# Patient Record
Sex: MALE | Race: WHITE | NOT HISPANIC OR LATINO | Employment: FULL TIME | URBAN - METROPOLITAN AREA
[De-identification: names, ages, dates, MRNs, and addresses within clinical notes are randomized per-mention and may not be internally consistent; named-entity substitution may affect disease eponyms.]

---

## 2017-03-02 ENCOUNTER — OFFICE VISIT (OUTPATIENT)
Dept: URGENT CARE | Facility: PHYSICIAN GROUP | Age: 27
End: 2017-03-02
Payer: COMMERCIAL

## 2017-03-02 VITALS
SYSTOLIC BLOOD PRESSURE: 122 MMHG | DIASTOLIC BLOOD PRESSURE: 76 MMHG | BODY MASS INDEX: 24.25 KG/M2 | HEART RATE: 80 BPM | WEIGHT: 195 LBS | TEMPERATURE: 98.6 F | OXYGEN SATURATION: 96 % | HEIGHT: 75 IN

## 2017-03-02 DIAGNOSIS — J01.40 ACUTE PANSINUSITIS, RECURRENCE NOT SPECIFIED: Primary | ICD-10-CM

## 2017-03-02 DIAGNOSIS — J40 BRONCHITIS: ICD-10-CM

## 2017-03-02 PROCEDURE — 99214 OFFICE O/P EST MOD 30 MIN: CPT | Performed by: PHYSICIAN ASSISTANT

## 2017-03-02 RX ORDER — CLARITHROMYCIN 500 MG/1
500 TABLET, COATED ORAL 2 TIMES DAILY
Qty: 20 TAB | Refills: 0 | Status: SHIPPED | OUTPATIENT
Start: 2017-03-02 | End: 2018-02-13

## 2017-03-02 ASSESSMENT — ENCOUNTER SYMPTOMS: COUGH: 1

## 2017-03-02 NOTE — Clinical Note
March 2, 2017         Patient: Ladarius Soler   YOB: 1990   Date of Visit: 3/2/2017           To Whom it May Concern:    Ladarius Soler was seen in my clinic on 3/2/2017. He may return to work on Monday March 6, 2017 or sooner if condition improves sooner.    If you have any questions or concerns, please don't hesitate to call.        Sincerely,           Divina Aguilar PA-C  Electronically Signed

## 2017-03-02 NOTE — PROGRESS NOTES
"Subjective:      Ladarius Soler is a 26 y.o. male who presents with Cough    PMH:Reviewed with patient/family member/EPIC.   MEDS:   Current outpatient prescriptions:   •  fluticasone (FLONASE) 50 MCG/ACT nasal spray, Spray 2 Sprays in nose every day., Disp: 1 Bottle, Rfl: 0  •  ibuprofen (MOTRIN) 800 MG TABS, Take 800 mg by mouth every 8 hours as needed., Disp: , Rfl:   •  naproxen (NAPROSYN) 500 MG TABS, Take 1 Tab by mouth 2 times a day, with meals., Disp: 60 Tab, Rfl: 0  ALLERGIES: No Known Allergies  SURGHX: History reviewed. No pertinent past surgical history.  SOCHX:  reports that he has never smoked. He has never used smokeless tobacco.  FH: Reviewed with patient/family. Not pertinent to this complaint.           HPI Comments: Patient presents with:  Cough: congestion, body weakness x 1 month        Cough  This is a new problem. The current episode started 1 to 4 weeks ago. The problem has been gradually worsening. The problem occurs every few minutes. The cough is productive of sputum. Associated symptoms include ear congestion, ear pain, headaches, nasal congestion, postnasal drip and rhinorrhea. Pertinent negatives include no fever, sore throat or wheezing. The symptoms are aggravated by lying down (exertion). He has tried body position changes and OTC cough suppressant (nasal steroid) for the symptoms. The treatment provided no relief.       Review of Systems   Constitutional: Positive for malaise/fatigue. Negative for fever.   HENT: Positive for congestion, ear pain, postnasal drip and rhinorrhea. Negative for sore throat.    Respiratory: Positive for cough and sputum production. Negative for wheezing.    Neurological: Positive for headaches.   All other systems reviewed and are negative.         Objective:     /76 mmHg  Pulse 80  Temp(Src) 37 °C (98.6 °F)  Ht 1.905 m (6' 3\")  Wt 88.451 kg (195 lb)  BMI 24.37 kg/m2  SpO2 96%     Physical Exam   Constitutional: He is oriented to person, " place, and time. He appears well-developed and well-nourished. No distress.   HENT:   Head: Normocephalic.   Right Ear: Tympanic membrane normal.   Left Ear: Tympanic membrane normal.   Nose: Right sinus exhibits maxillary sinus tenderness and frontal sinus tenderness. Left sinus exhibits maxillary sinus tenderness and frontal sinus tenderness.   Mouth/Throat: Uvula is midline and mucous membranes are normal. Posterior oropharyngeal erythema (+ PND present) present.   Eyes: Conjunctivae and EOM are normal. Pupils are equal, round, and reactive to light.   Neck: Normal range of motion. Neck supple.   Cardiovascular: Normal rate, regular rhythm and normal heart sounds.    Pulmonary/Chest: Effort normal and breath sounds normal. He has no rales.   Abdominal: Soft.   Musculoskeletal: Normal range of motion.   Neurological: He is alert and oriented to person, place, and time.   Skin: Skin is warm.   Psychiatric: He has a normal mood and affect.   Nursing note and vitals reviewed.         Assessment/Plan:     1. Acute pansinusitis, recurrence not specified  clarithromycin (BIAXIN) 500 MG Tab    Hydrocod Polst-CPM Polst ER (TUSSIONEX) 10-8 MG/5ML Suspension Extended Release   2. Bronchitis  clarithromycin (BIAXIN) 500 MG Tab    Hydrocod Polst-CPM Polst ER (TUSSIONEX) 10-8 MG/5ML Suspension Extended Release     PT can continue OTC medications, increase fluids and rest until symptoms improve.     Motrin/Advil/Ibuprophen 600 mg every 6 hours as needed for pain or fever.    PT should follow up with PCP in 1-2 days for re-evaluation if symptoms have not improved.  Discussed red flags and reasons to return to UC or ED.  Pt and/or family verbalized understanding of diagnosis and follow up instructions and declined informational handout on diagnosis.  PT discharged.

## 2017-03-02 NOTE — MR AVS SNAPSHOT
"        Ladarius Soler   3/2/2017 2:15 PM   Office Visit   MRN: [id number]    Department:  [facility]   Dept Phone:  [phone]    Description:  Male : 1990   Provider:  Divina Aguilar PA-C           Reason for Visit     Cough congestion, body weakness x 1 month      Allergies as of 3/2/2017     No Known Allergies      You were diagnosed with     Acute recurrent pansinusitis   [070697]       Bronchitis   [[id number]]         Vital Signs     Blood Pressure Pulse Temperature Height Weight Body Mass Index    122/76 mmHg 80 37 °C (98.6 °F) 1.905 m (6' 3\") 88.451 kg (195 lb) 24.37 kg/m2    Oxygen Saturation Smoking Status                96% Never Smoker           Basic Information     Date Of Birth Sex Race Ethnicity Preferred Language    1990 Male White Non- English      Health Maintenance        Date Due Completion Dates    IMM HEP B VACCINE (1 of 3 - Primary Series) 1990 ---    IMM HEP A VACCINE (1 of 2 - Standard Series) 6/15/1993 ---    IMM HPV VACCINE (1 of 3 - Male 3 Dose Series) 6/15/2003 ---    IMM VARICELLA (CHICKENPOX) VACCINE (1 of 2 - 2 Dose Adolescent Series) 6/15/2005 ---    IMM DTaP/Tdap/Td Vaccine (1 - Tdap) 6/15/2011 ---    IMM INFLUENZA (1) 2016 ---            Current Immunizations     No immunizations on file.      Below and/or attached are the medications your provider expects you to take. Review all of your home medications and newly ordered medications with your provider and/or pharmacist. Follow medication instructions as directed by your provider and/or pharmacist. Please keep your medication list with you and share with your provider. Update the information when medications are discontinued, doses are changed, or new medications (including over-the-counter products) are added; and carry medication information at all times in the event of emergency situations     Allergies:  No Known Allergies          Medications  Valid as of: 2017 -  2:52 PM   " Generic Name Brand Name Tablet Size Instructions for use    Clarithromycin (Tab) BIAXIN 500 MG Take 1 Tab by mouth 2 times a day.        Fluticasone Propionate (Suspension) FLONASE 50 MCG/ACT Spray 2 Sprays in nose every day.        Hydrocod Polst-Chlorphen Polst (Suspension Extended Release) TUSSIONEX 10-8 MG/5ML Take 5 mL by mouth every 12 hours.        Ibuprofen (Tab) MOTRIN 800 MG Take 800 mg by mouth every 8 hours as needed.        Naproxen (Tab) NAPROSYN 500 MG Take 1 Tab by mouth 2 times a day, with meals.        .                 Medicines prescribed today were sent to:     [id number]  [place], [place] - [address] [facility] [place]    [address] [place] [address]    Phone: [phone] Fax: [phone]    Open 24 Hours?: No      Medication refill instructions:       If your prescription bottle indicates you have medication refills left, it is not necessary to call your provider’s office. Please contact your pharmacy and they will refill your medication.    If your prescription bottle indicates you do not have any refills left, you may request refills at any time through one of the following ways: The online DataRobot system (except Urgent Care), by calling your provider’s office, or by asking your pharmacy to contact your provider’s office with a refill request. Medication refills are processed only during regular business hours and may not be available until the next business day. Your provider may request additional information or to have a follow-up visit with you prior to refilling your medication.   *Please Note: Medication refills are assigned a new Rx number when refilled electronically. Your pharmacy may indicate that no refills were authorized even though a new prescription for the same medication is available at the pharmacy. Please request the medicine by name with the pharmacy before contacting your provider for a refill.           DataRobot Access Code:  [email or web address]  Expires: 4/1/2017  2:10 PM    Lessons Only  A secure, online tool to manage your health information     [employer]’s Lessons Only® is a secure, online tool that connects you to your personalized health information from the privacy of your home -- day or night - making it very easy for you to manage your healthcare. Once the activation process is completed, you can even access your medical information using the Lessons Only johana, which is available for free in the Apple Johana store or Google Play store.     Lessons Only provides the following levels of access (as shown below):   My Chart Features   [facility] Primary Care Doctor [facility]  Specialists [facility]  Urgent  Care Non-[facility]  Primary Care  Doctor   Email your healthcare team securely and privately 24/7 X X X    Manage appointments: schedule your next appointment; view details of past/upcoming appointments X      Request prescription refills. X      View recent personal medical records, including lab and immunizations X X X X   View health record, including health history, allergies, medications X X X X   Read reports about your outpatient visits, procedures, consult and ER notes X X X X   See your discharge summary, which is a recap of your hospital and/or ER visit that includes your diagnosis, lab results, and care plan. X X       How to register for Lessons Only:  1. Go to  [email or web address].  2. Click on the Sign Up Now box, which takes you to the New Member Sign Up page. You will need to provide the following information:  a. Enter your Lessons Only Access Code exactly as it appears at the top of this page. (You will not need to use this code after you’ve completed the sign-up process. If you do not sign up before the expiration date, you must request a new code.)   b. Enter your date of birth.   c. Enter your home email address.   d. Click Submit, and follow the next screen’s instructions.  3. Create a Lessons Only ID. This will be your Lessons Only login ID and cannot be  changed, so think of one that is secure and easy to remember.  4. Create a Prom[employer]ior password. You can change your password at any time.  5. Enter your Password Reset Question and Answer. This can be used at a later time if you forget your password.   6. Enter your e-mail address. This allows you to receive e-mail notifications when new information is available in Promedior.  7. Click Sign Up. You can now view your health information.    For assistance activating your Promedior account, call [phone]

## 2017-03-06 ASSESSMENT — ENCOUNTER SYMPTOMS
SPUTUM PRODUCTION: 1
RHINORRHEA: 1
WHEEZING: 0
FEVER: 0
HEADACHES: 1
SORE THROAT: 0

## 2017-03-23 ENCOUNTER — OFFICE VISIT (OUTPATIENT)
Dept: URGENT CARE | Facility: PHYSICIAN GROUP | Age: 27
End: 2017-03-23
Payer: COMMERCIAL

## 2017-03-23 VITALS
TEMPERATURE: 97.4 F | BODY MASS INDEX: 23.75 KG/M2 | WEIGHT: 195 LBS | OXYGEN SATURATION: 99 % | HEIGHT: 76 IN | HEART RATE: 100 BPM | DIASTOLIC BLOOD PRESSURE: 84 MMHG | SYSTOLIC BLOOD PRESSURE: 152 MMHG

## 2017-03-23 DIAGNOSIS — R05.9 COUGH: ICD-10-CM

## 2017-03-23 DIAGNOSIS — J02.9 PHARYNGITIS, UNSPECIFIED ETIOLOGY: ICD-10-CM

## 2017-03-23 LAB
INT CON NEG: NEGATIVE
INT CON POS: POSITIVE
S PYO AG THROAT QL: NORMAL

## 2017-03-23 PROCEDURE — 87880 STREP A ASSAY W/OPTIC: CPT | Performed by: PHYSICIAN ASSISTANT

## 2017-03-23 PROCEDURE — 99214 OFFICE O/P EST MOD 30 MIN: CPT | Performed by: PHYSICIAN ASSISTANT

## 2017-03-23 RX ORDER — FLUTICASONE PROPIONATE 50 MCG
1 SPRAY, SUSPENSION (ML) NASAL DAILY
Qty: 16 G | Refills: 0 | Status: SHIPPED | OUTPATIENT
Start: 2017-03-23 | End: 2021-05-03

## 2017-03-23 ASSESSMENT — ENCOUNTER SYMPTOMS
DIARRHEA: 0
SORE THROAT: 1
COUGH: 1
SHORTNESS OF BREATH: 0
SPUTUM PRODUCTION: 0
NAUSEA: 0
CHILLS: 0
ABDOMINAL PAIN: 0
WHEEZING: 0
VOMITING: 0
FEVER: 0

## 2017-03-23 NOTE — MR AVS SNAPSHOT
"Ladarius Soler   3/23/2017 1:55 PM   Office Visit   MRN: [id number]    Department:  [facility]   Dept Phone:  [phone]    Description:  Male : 1990   Provider:  George Lopez PA-C           Reason for Visit     Pharyngitis sore throat, mild cough, sob x2days       Allergies as of 3/23/2017     No Known Allergies      You were diagnosed with     Cough   [786.2.ICD-9-CM]       Pharyngitis, unspecified etiology   [7123657]         Vital Signs     Blood Pressure Pulse Temperature Height Weight Body Mass Index    152/84 mmHg 100 36.3 °C (97.4 °F) 1.93 m (6' 4\") 88.451 kg (195 lb) 23.75 kg/m2    Oxygen Saturation Smoking Status                99% Never Smoker           Basic Information     Date Of Birth Sex Race Ethnicity Preferred Language    1990 Male White Non- English      Health Maintenance        Date Due Completion Dates    IMM HEP B VACCINE (1 of 3 - Primary Series) 1990 ---    IMM HEP A VACCINE (1 of 2 - Standard Series) 6/15/1993 ---    IMM HPV VACCINE (1 of 3 - Male 3 Dose Series) 6/15/2003 ---    IMM VARICELLA (CHICKENPOX) VACCINE (1 of 2 - 2 Dose Adolescent Series) 6/15/2005 ---    IMM DTaP/Tdap/Td Vaccine (1 - Tdap) 6/15/2011 ---    IMM INFLUENZA (1) 2016 ---            Results     POCT Rapid Strep A      Component    Rapid Strep Screen    neg    Internal Control Positive    Positive    Internal Control Negative    Negative                        Current Immunizations     No immunizations on file.      Below and/or attached are the medications your provider expects you to take. Review all of your home medications and newly ordered medications with your provider and/or pharmacist. Follow medication instructions as directed by your provider and/or pharmacist. Please keep your medication list with you and share with your provider. Update the information when medications are discontinued, doses are changed, or new medications (including over-the-counter " products) are added; and carry medication information at all times in the event of emergency situations     Allergies:  No Known Allergies          Medications  Valid as of: March 23, 2017 -  7:10 PM    Generic Name Brand Name Tablet Size Instructions for use    Clarithromycin (Tab) BIAXIN 500 MG Take 1 Tab by mouth 2 times a day.        Fluticasone Propionate (Suspension) FLONASE 50 MCG/ACT Spray 2 Sprays in nose every day.        Fluticasone Propionate (Suspension) FLONASE 50 MCG/ACT Spray 1 Spray in nose every day.        Hydrocod Polst-Chlorphen Polst (Suspension Extended Release) TUSSIONEX 10-8 MG/5ML Take 5 mL by mouth every 12 hours.        Ibuprofen (Tab) MOTRIN 800 MG Take 800 mg by mouth every 8 hours as needed.        Lidocaine HCl (Solution) XYLOCAINE 2 % Take 5 mL by mouth as needed for Throat/Mouth Pain (q6hr PRN throat pain, ok to rinse and spit or swallow).        Naproxen (Tab) NAPROSYN 500 MG Take 1 Tab by mouth 2 times a day, with meals.        .                 Medicines prescribed today were sent to:     [facility] [address] [place] [address] AT [address] [facility] [address]    Phone: [phone] Fax: [phone]    Open 24 Hours?: No      Medication refill instructions:       If your prescription bottle indicates you have medication refills left, it is not necessary to call your provider’s office. Please contact your pharmacy and they will refill your medication.    If your prescription bottle indicates you do not have any refills left, you may request refills at any time through one of the following ways: The online Connectify system (except Urgent Care), by calling your provider’s office, or by asking your pharmacy to contact your provider’s office with a refill request. Medication refills are processed only during regular business hours and may not be available until the next business day. Your provider may request additional  information or to have a follow-up visit with you prior to refilling your medication.   *Please Note: Medication refills are assigned a new Rx number when refilled electronically. Your pharmacy may indicate that no refills were authorized even though a new prescription for the same medication is available at the pharmacy. Please request the medicine by name with the pharmacy before contacting your provider for a refill.           ZeroG Wireless Access Code: [email or web address]  Expires: 4/1/2017  3:10 PM    ZeroG Wireless  A secure, online tool to manage your health information     [employer]’s ZeroG Wireless® is a secure, online tool that connects you to your personalized health information from the privacy of your home -- day or night - making it very easy for you to manage your healthcare. Once the activation process is completed, you can even access your medical information using the ZeroG Wireless johana, which is available for free in the Apple Johana store or Google Play store.     ZeroG Wireless provides the following levels of access (as shown below):   My Chart Features   [facility] Primary Care Doctor [facility]  Specialists [facility]  Urgent  Care Non-[facility]  Primary Care  Doctor   Email your healthcare team securely and privately 24/7 X X X    Manage appointments: schedule your next appointment; view details of past/upcoming appointments X      Request prescription refills. X      View recent personal medical records, including lab and immunizations X X X X   View health record, including health history, allergies, medications X X X X   Read reports about your outpatient visits, procedures, consult and ER notes X X X X   See your discharge summary, which is a recap of your hospital and/or ER visit that includes your diagnosis, lab results, and care plan. X X       How to register for ZeroG Wireless:  1. Go to  [email or web address].  2. Click on the Sign Up Now box, which takes you to the New Member Sign Up page. You will need to provide the following  information:  a. Enter your WritePath Access Code exactly as it appears at the top of this page. (You will not need to use this code after you’ve completed the sign-up process. If you do not sign up before the expiration date, you must request a new code.)   b. Enter your date of birth.   c. Enter your home email address.   d. Click Submit, and follow the next screen’s instructions.  3. Create a WritePath ID. This will be your WritePath login ID and cannot be changed, so think of one that is secure and easy to remember.  4. Create a WritePath password. You can change your password at any time.  5. Enter your Password Reset Question and Answer. This can be used at a later time if you forget your password.   6. Enter your e-mail address. This allows you to receive e-mail notifications when new information is available in WritePath.  7. Click Sign Up. You can now view your health information.    For assistance activating your WritePath account, call [phone]

## 2017-03-23 NOTE — Clinical Note
March 23, 2017       Patient: Ladarius Soler   YOB: 1990   Date of Visit: 3/23/2017         To Whom It May Concern:    It is my medical opinion that Ladarius Soler should be permitted to return to work late as he was at a doctor's visit.    If you have any questions or concerns, please don't hesitate to call [phone]          Sincerely,          George Lopez PA-C  Electronically Signed

## 2017-03-23 NOTE — PROGRESS NOTES
Subjective:      Ladarius Soler is a 26 y.o. male who presents with Pharyngitis            Pharyngitis   Associated symptoms include congestion, coughing ( very mild) and ear pain ( mild). Pertinent negatives include no abdominal pain, diarrhea, shortness of breath or vomiting.   Pt tx'ed for bronchitis, finished abx about 2wks ago, still w/ ST/cough, notes throat pain last two days, denies fever/chills, cough has been resolved, right ear press, denies much pain, sinus congestion has improved denies chest congestion, denies nausea/voimting/abdpain/diarrhea/rash. Denies PMH of bronnchits, dneies PMH of asthma/pneumonia, some seasonal allerg. Tried using nyquil/mucinex. Dayquil.     Review of Systems   Constitutional: Negative for fever and chills.   HENT: Positive for congestion, ear pain ( mild) and sore throat ( primary complaint).    Respiratory: Positive for cough ( very mild). Negative for sputum production, shortness of breath and wheezing.    Gastrointestinal: Negative for nausea, vomiting, abdominal pain and diarrhea.   Skin: Negative for rash.   Endo/Heme/Allergies: Positive for environmental allergies.       PMH:  has no past medical history on file.  MEDS:   Current outpatient prescriptions:   •  clarithromycin (BIAXIN) 500 MG Tab, Take 1 Tab by mouth 2 times a day., Disp: 20 Tab, Rfl: 0  •  Hydrocod Polst-CPM Polst ER (TUSSIONEX) 10-8 MG/5ML Suspension Extended Release, Take 5 mL by mouth every 12 hours., Disp: 140 mL, Rfl: 0  •  fluticasone (FLONASE) 50 MCG/ACT nasal spray, Spray 2 Sprays in nose every day., Disp: 1 Bottle, Rfl: 0  •  ibuprofen (MOTRIN) 800 MG TABS, Take 800 mg by mouth every 8 hours as needed., Disp: , Rfl:   •  naproxen (NAPROSYN) 500 MG TABS, Take 1 Tab by mouth 2 times a day, with meals., Disp: 60 Tab, Rfl: 0  ALLERGIES: No Known Allergies  SURGHX: No past surgical history on file.  SOCHX:  reports that he has never smoked. He has never used smokeless tobacco.  FH: Family history  "was reviewed, no pertinent findings to report    I have worn a mask for the entire encounter with this patient.      Objective:     /84 mmHg  Pulse 100  Temp(Src) 36.3 °C (97.4 °F)  Ht 1.93 m (6' 4\")  Wt 88.451 kg (195 lb)  BMI 23.75 kg/m2  SpO2 99%     Physical Exam   Constitutional: He is oriented to person, place, and time. He appears well-developed and well-nourished. No distress.   HENT:   Head: Normocephalic and atraumatic.   Right Ear: Tympanic membrane, external ear and ear canal normal.   Left Ear: Tympanic membrane, external ear and ear canal normal.   Nose: Nose normal. Right sinus exhibits no maxillary sinus tenderness and no frontal sinus tenderness. Left sinus exhibits no maxillary sinus tenderness and no frontal sinus tenderness.   Mouth/Throat: Uvula is midline and mucous membranes are normal. Posterior oropharyngeal erythema ( mild PND) present. No oropharyngeal exudate, posterior oropharyngeal edema or tonsillar abscesses.   Eyes: Conjunctivae are normal. Right eye exhibits no discharge. Left eye exhibits no discharge. No scleral icterus.   Neck: Neck supple.   Pulmonary/Chest: Effort normal and breath sounds normal. No respiratory distress. He has no decreased breath sounds. He has no wheezes. He has no rhonchi. He has no rales.   Musculoskeletal: Normal range of motion.   Lymphadenopathy:     He has cervical adenopathy ( mild bilat).   Neurological: He is alert and oriented to person, place, and time. Coordination normal.   Skin: Skin is warm and dry. He is not diaphoretic. No pallor.   Psychiatric: He has a normal mood and affect.   Nursing note and vitals reviewed.       POCT strep - NEG       Assessment/Plan:     1. Cough  Supportive care is reviewed with patient/caregiver - recommend to push PO fluids and electrolytes, Nsaids/tylenol, netti pot/saline irrig, humidifier in home, flonase, ponaris, antihistamines, discuss likely resolving cough from bronchitis and seasonal allergic " rhinitis w/ PND, trend resolution w/ supportive care  Return to clinic with lack of resolution or progression of symptoms.  Sent w/ work note  - POCT Rapid Strep A    2. Pharyngitis, unspecified etiology  - lidocaine viscous 2% (XYLOCAINE) 2 % Solution; Take 5 mL by mouth as needed for Throat/Mouth Pain (q6hr PRN throat pain, ok to rinse and spit or swallow).  Dispense: 120 mL; Refill: 0  - fluticasone (FLONASE) 50 MCG/ACT nasal spray; Spray 1 Spray in nose every day.  Dispense: 16 g; Refill: 0

## 2017-08-03 ENCOUNTER — HOSPITAL ENCOUNTER (OUTPATIENT)
Facility: MEDICAL CENTER | Age: 27
End: 2017-08-03
Attending: PHYSICIAN ASSISTANT
Payer: COMMERCIAL

## 2017-08-03 ENCOUNTER — OFFICE VISIT (OUTPATIENT)
Dept: URGENT CARE | Facility: CLINIC | Age: 27
End: 2017-08-03
Payer: COMMERCIAL

## 2017-08-03 VITALS
HEIGHT: 75 IN | DIASTOLIC BLOOD PRESSURE: 80 MMHG | WEIGHT: 195 LBS | SYSTOLIC BLOOD PRESSURE: 126 MMHG | BODY MASS INDEX: 24.25 KG/M2 | RESPIRATION RATE: 14 BRPM | OXYGEN SATURATION: 100 % | HEART RATE: 66 BPM | TEMPERATURE: 98.2 F

## 2017-08-03 DIAGNOSIS — J02.9 SORE THROAT: ICD-10-CM

## 2017-08-03 DIAGNOSIS — J35.1 HYPERTROPHY TONSILS: ICD-10-CM

## 2017-08-03 LAB
INT CON NEG: NEGATIVE
INT CON POS: POSITIVE
S PYO AG THROAT QL: NORMAL

## 2017-08-03 PROCEDURE — 99000 SPECIMEN HANDLING OFFICE-LAB: CPT | Performed by: PHYSICIAN ASSISTANT

## 2017-08-03 PROCEDURE — 87070 CULTURE OTHR SPECIMN AEROBIC: CPT

## 2017-08-03 PROCEDURE — 87880 STREP A ASSAY W/OPTIC: CPT | Performed by: PHYSICIAN ASSISTANT

## 2017-08-03 PROCEDURE — 99214 OFFICE O/P EST MOD 30 MIN: CPT | Performed by: PHYSICIAN ASSISTANT

## 2017-08-03 ASSESSMENT — ENCOUNTER SYMPTOMS
DIARRHEA: 0
VOMITING: 0
FEVER: 0
DIZZINESS: 0
NECK PAIN: 0
SORE THROAT: 1
EYE REDNESS: 0
ABDOMINAL PAIN: 0
CHILLS: 0
WHEEZING: 0
EYE DISCHARGE: 0
TINGLING: 0
MYALGIAS: 0
COUGH: 0
HEADACHES: 0

## 2017-08-03 ASSESSMENT — PAIN SCALES - GENERAL: PAINLEVEL: 4=SLIGHT-MODERATE PAIN

## 2017-08-03 ASSESSMENT — PATIENT HEALTH QUESTIONNAIRE - PHQ9: CLINICAL INTERPRETATION OF PHQ2 SCORE: 0

## 2017-08-03 NOTE — PROGRESS NOTES
"Subjective:      Ladarius Soler is a 27 y.o. male who presents with Pharyngitis            Pharyngitis   This is a new problem. The current episode started today. The problem has been unchanged. Neither side of throat is experiencing more pain than the other. There has been no fever. The pain is at a severity of 3/10. The pain is mild. Pertinent negatives include no abdominal pain, congestion, coughing, diarrhea, ear discharge, headaches, neck pain or vomiting. He has had no exposure to strep or mono. He has tried nothing for the symptoms. The treatment provided no relief.       Review of Systems   Constitutional: Negative for fever, chills and malaise/fatigue.   HENT: Positive for sore throat. Negative for congestion and ear discharge.    Eyes: Negative for discharge and redness.   Respiratory: Negative for cough and wheezing.    Cardiovascular: Negative for chest pain and leg swelling.   Gastrointestinal: Negative for vomiting, abdominal pain and diarrhea.   Genitourinary: Negative for dysuria and urgency.   Musculoskeletal: Negative for myalgias and neck pain.   Skin: Negative for itching and rash.   Neurological: Negative for dizziness, tingling and headaches.          Objective:     /80 mmHg  Pulse 66  Temp(Src) 36.8 °C (98.2 °F)  Resp 14  Ht 1.905 m (6' 3\")  Wt 88.451 kg (195 lb)  BMI 24.37 kg/m2  SpO2 100%   PMH:  has no past medical history on file.  MEDS:   Current outpatient prescriptions:   •  fluticasone (FLONASE) 50 MCG/ACT nasal spray, Spray 1 Spray in nose every day., Disp: 16 g, Rfl: 0  •  lidocaine viscous 2% (XYLOCAINE) 2 % Solution, Take 5 mL by mouth as needed for Throat/Mouth Pain (q6hr PRN throat pain, ok to rinse and spit or swallow)., Disp: 120 mL, Rfl: 0  •  clarithromycin (BIAXIN) 500 MG Tab, Take 1 Tab by mouth 2 times a day., Disp: 20 Tab, Rfl: 0  •  Hydrocod Polst-CPM Polst ER (TUSSIONEX) 10-8 MG/5ML Suspension Extended Release, Take 5 mL by mouth every 12 hours., Disp: " 140 mL, Rfl: 0  •  fluticasone (FLONASE) 50 MCG/ACT nasal spray, Spray 2 Sprays in nose every day., Disp: 1 Bottle, Rfl: 0  •  ibuprofen (MOTRIN) 800 MG TABS, Take 800 mg by mouth every 8 hours as needed., Disp: , Rfl:   •  naproxen (NAPROSYN) 500 MG TABS, Take 1 Tab by mouth 2 times a day, with meals., Disp: 60 Tab, Rfl: 0  ALLERGIES: No Known Allergies  SURGHX: History reviewed. No pertinent past surgical history.  SOCHX:  reports that he has never smoked. He has never used smokeless tobacco.  FH: Family history was reviewed, no pertinent findings to report    Physical Exam   Constitutional: He is oriented to person, place, and time. He appears well-developed and well-nourished.   HENT:   Head: Normocephalic and atraumatic.   Right Ear: External ear normal.   Left Ear: External ear normal.   Nose: Nose normal.   Mouth/Throat: No oropharyngeal exudate.   Posterior oropharynx with tonsillar edema with noted erythema. Noted hypertrophic tonsils.  Without evidence of abscess formation.    Eyes: EOM are normal. Pupils are equal, round, and reactive to light.   Neck: Normal range of motion. Neck supple. No thyromegaly present.   Cardiovascular: Normal rate and regular rhythm.    Pulmonary/Chest: Effort normal and breath sounds normal. No respiratory distress.   Musculoskeletal: Normal range of motion. He exhibits no edema.   Lymphadenopathy:     He has no cervical adenopathy.   Neurological: He is alert and oriented to person, place, and time.   Skin: Skin is warm. No rash noted. No pallor.   Psychiatric: He has a normal mood and affect. His behavior is normal.   Vitals reviewed.            Strep- negative.     Assessment/Plan:     1. Sore throat  - POCT Rapid Strep A    2. Hypertrophic tonsils.     Pt. Without any adenopathy- will send off for throat culture by the request of the patient today. Pt. Is without any associated adenopathy- will tx. If culture indicates- other supportive therapies were discussed today.      Patient given precautionary s/sx that mandate immediate follow up and evaluation in the ED. Advised of risks of not doing so.    DDX, Supportive care, and indications for immediate follow-up discussed with patient.    Instructed to return to clinic or nearest emergency department if we are not available for any change in condition, further concerns, or worsening of symptoms.    The patient demonstrated a good understanding and agreed with the treatment plan.

## 2017-08-03 NOTE — MR AVS SNAPSHOT
"        Ladarius Soler   8/3/2017 12:30 PM   Office Visit   MRN: [id number]    Department:  [facility]   Dept Phone:  [phone]    Description:  Male : 1990   Provider:  Geronimo Jessica PA-C           Reason for Visit     Pharyngitis x1 day poss strep       Allergies as of 8/3/2017     No Known Allergies      You were diagnosed with     Sore throat   [848029]       Hypertrophy tonsils   [[id number]]         Vital Signs     Blood Pressure Pulse Temperature Respirations Height Weight    126/80 mmHg 66 36.8 °C (98.2 °F) 14 1.905 m (6' 3\") 88.451 kg (195 lb)    Body Mass Index Oxygen Saturation Smoking Status             24.37 kg/m2 100% Never Smoker          Basic Information     Date Of Birth Sex Race Ethnicity Preferred Language    1990 Male White Non- English      Health Maintenance        Date Due Completion Dates    IMM HEP B VACCINE (1 of 3 - Primary Series) 1990 ---    IMM HEP A VACCINE (1 of 2 - Standard Series) 6/15/1993 ---    IMM HPV VACCINE (1 of 3 - Male 3 Dose Series) 6/15/2003 ---    IMM VARICELLA (CHICKENPOX) VACCINE (1 of 2 - 2 Dose Adolescent Series) 6/15/2005 ---    IMM DTaP/Tdap/Td Vaccine (1 - Tdap) 6/15/2011 ---    IMM INFLUENZA (1) 2017 ---            Results     POCT Rapid Strep A      Component    Rapid Strep Screen    neg    Internal Control Positive    Positive    Internal Control Negative    Negative                        Current Immunizations     No immunizations on file.      Below and/or attached are the medications your provider expects you to take. Review all of your home medications and newly ordered medications with your provider and/or pharmacist. Follow medication instructions as directed by your provider and/or pharmacist. Please keep your medication list with you and share with your provider. Update the information when medications are discontinued, doses are changed, or new medications (including over-the-counter products) are added; and " carry medication information at all times in the event of emergency situations     Allergies:  No Known Allergies          Medications  Valid as of: August 03, 2017 -  1:40 PM    Generic Name Brand Name Tablet Size Instructions for use    Clarithromycin (Tab) BIAXIN 500 MG Take 1 Tab by mouth 2 times a day.        Fluticasone Propionate (Suspension) FLONASE 50 MCG/ACT Spray 2 Sprays in nose every day.        Fluticasone Propionate (Suspension) FLONASE 50 MCG/ACT Spray 1 Spray in nose every day.        Hydrocod Polst-Chlorphen Polst (Suspension Extended Release) TUSSIONEX 10-8 MG/5ML Take 5 mL by mouth every 12 hours.        Ibuprofen (Tab) MOTRIN 800 MG Take 800 mg by mouth every 8 hours as needed.        Lidocaine HCl (Solution) XYLOCAINE 2 % Take 5 mL by mouth as needed for Throat/Mouth Pain (q6hr PRN throat pain, ok to rinse and spit or swallow).        Naproxen (Tab) NAPROSYN 500 MG Take 1 Tab by mouth 2 times a day, with meals.        .                 Medicines prescribed today were sent to:     [facility][id number] - [place], [place] - [address]    [address] [place] [address]    Phone: [phone] Fax: [phone]    Open 24 Hours?: No      Medication refill instructions:       If your prescription bottle indicates you have medication refills left, it is not necessary to call your provider’s office. Please contact your pharmacy and they will refill your medication.    If your prescription bottle indicates you do not have any refills left, you may request refills at any time through one of the following ways: The online [employer] Capture system (except Urgent Care), by calling your provider’s office, or by asking your pharmacy to contact your provider’s office with a refill request. Medication refills are processed only during regular business hours and may not be available until the next business day. Your provider may request additional information or to have a follow-up visit with you prior to refilling  your medication.   *Please Note: Medication refills are assigned a new Rx number when refilled electronically. Your pharmacy may indicate that no refills were authorized even though a new prescription for the same medication is available at the pharmacy. Please request the medicine by name with the pharmacy before contacting your provider for a refill.        Your To Do List     Future Labs/Procedures Complete By Expires    EVELYN THROAT  As directed 8/3/2018         Prolify Access Code: [email or web address]  Expires: 9/2/2017 12:19 PM    Prolify  A secure, online tool to manage your health information     [employer]’s Prolify® is a secure, online tool that connects you to your personalized health information from the privacy of your home -- day or night - making it very easy for you to manage your healthcare. Once the activation process is completed, you can even access your medical information using the Prolify johana, which is available for free in the Apple Johana store or Google Play store.     Prolify provides the following levels of access (as shown below):   My Chart Features   [facility] Doctor [facility]  Specialists [facility]  Urgent  Care Non-[facility]  Primary Care  Doctor   Email your healthcare team securely and privately 24/7 X X X    Manage appointments: schedule your next appointment; view details of past/upcoming appointments X      Request prescription refills. X      View recent personal medical records, including lab and immunizations X X X X   View health record, including health history, allergies, medications X X X X   Read reports about your outpatient visits, procedures, consult and ER notes X X X X   See your discharge summary, which is a recap of your hospital and/or ER visit that includes your diagnosis, lab results, and care plan. X X       How to register for Prolify:  1. Go to  [email or web address].  2. Click on the Sign Up Now box, which takes you to the New Member Sign Up page. You  will need to provide the following information:  a. Enter your Mantex Access Code exactly as it appears at the top of this page. (You will not need to use this code after you’ve completed the sign-up process. If you do not sign up before the expiration date, you must request a new code.)   b. Enter your date of birth.   c. Enter your home email address.   d. Click Submit, and follow the next screen’s instructions.  3. Create a [employer] ID. This will be your Mantex login ID and cannot be changed, so think of one that is secure and easy to remember.  4. Create a Mantex password. You can change your password at any time.  5. Enter your Password Reset Question and Answer. This can be used at a later time if you forget your password.   6. Enter your e-mail address. This allows you to receive e-mail notifications when new information is available in Mantex.  7. Click Sign Up. You can now view your health information.    For assistance activating your Mantex account, call [phone]

## 2017-08-03 NOTE — Clinical Note
August 3, 2017         Patient: Ladarius Soler   YOB: 1990   Date of Visit: 8/3/2017           To Whom it May Concern:    Ladarius Soler was seen in my clinic on 8/3/2017. Please excuse this patient from work due to recent illness.     If you have any questions or concerns, please don't hesitate to call.        Sincerely,           Geronimo Jessica PA-C  Electronically Signed

## 2017-08-06 LAB
BACTERIA SPEC RESP CULT: NORMAL
SIGNIFICANT IND 70042: NORMAL
SOURCE SOURCE: NORMAL

## 2017-08-22 ENCOUNTER — TELEPHONE (OUTPATIENT)
Dept: URGENT CARE | Facility: CLINIC | Age: 27
End: 2017-08-22

## 2017-08-23 NOTE — TELEPHONE ENCOUNTER
----- Message from Geronimo Jessica PA-C sent at 8/6/2017  2:50 PM PDT -----  Please alert this patient of his negative throat culture and Antibiotic therapy is not warranted at this time. Please encourage the patient that his symptoms are more than likely viral in nature. If he has any further concern we would be happy to check him.   Thanks!

## 2017-10-24 ENCOUNTER — OFFICE VISIT (OUTPATIENT)
Dept: URGENT CARE | Facility: PHYSICIAN GROUP | Age: 27
End: 2017-10-24
Payer: COMMERCIAL

## 2017-10-24 ENCOUNTER — HOSPITAL ENCOUNTER (OUTPATIENT)
Facility: MEDICAL CENTER | Age: 27
End: 2017-10-24
Attending: PHYSICIAN ASSISTANT
Payer: COMMERCIAL

## 2017-10-24 VITALS
BODY MASS INDEX: 25.94 KG/M2 | TEMPERATURE: 98.2 F | WEIGHT: 213 LBS | OXYGEN SATURATION: 97 % | HEIGHT: 76 IN | DIASTOLIC BLOOD PRESSURE: 88 MMHG | SYSTOLIC BLOOD PRESSURE: 122 MMHG | RESPIRATION RATE: 14 BRPM | HEART RATE: 63 BPM

## 2017-10-24 DIAGNOSIS — R42 DIZZINESS: ICD-10-CM

## 2017-10-24 DIAGNOSIS — R35.0 FREQUENT URINATION: ICD-10-CM

## 2017-10-24 LAB
APPEARANCE UR: NORMAL
BILIRUB UR STRIP-MCNC: NORMAL MG/DL
C TRACH DNA SPEC QL NAA+PROBE: NEGATIVE
COLOR UR AUTO: YELLOW
GLUCOSE UR STRIP.AUTO-MCNC: NORMAL MG/DL
KETONES UR STRIP.AUTO-MCNC: NORMAL MG/DL
LEUKOCYTE ESTERASE UR QL STRIP.AUTO: NORMAL
N GONORRHOEA DNA SPEC QL NAA+PROBE: NEGATIVE
NITRITE UR QL STRIP.AUTO: NORMAL
PH UR STRIP.AUTO: 7.5 [PH] (ref 5–8)
PROT UR QL STRIP: NORMAL MG/DL
RBC UR QL AUTO: NORMAL
SP GR UR STRIP.AUTO: 1
SPECIMEN SOURCE: NORMAL
UROBILINOGEN UR STRIP-MCNC: NORMAL MG/DL

## 2017-10-24 PROCEDURE — 87591 N.GONORRHOEAE DNA AMP PROB: CPT

## 2017-10-24 PROCEDURE — 81002 URINALYSIS NONAUTO W/O SCOPE: CPT | Performed by: PHYSICIAN ASSISTANT

## 2017-10-24 PROCEDURE — 87491 CHLMYD TRACH DNA AMP PROBE: CPT

## 2017-10-24 PROCEDURE — 99214 OFFICE O/P EST MOD 30 MIN: CPT | Performed by: PHYSICIAN ASSISTANT

## 2017-10-24 RX ORDER — MECLIZINE HYDROCHLORIDE 25 MG/1
25 TABLET ORAL 3 TIMES DAILY PRN
Qty: 30 TAB | Refills: 0 | Status: SHIPPED | OUTPATIENT
Start: 2017-10-24 | End: 2021-05-03

## 2017-10-24 ASSESSMENT — ENCOUNTER SYMPTOMS
SHORTNESS OF BREATH: 0
FEVER: 0
CHILLS: 0
NAUSEA: 1
SORE THROAT: 0
HEADACHES: 0
COUGH: 0
MUSCULOSKELETAL NEGATIVE: 1
ABDOMINAL PAIN: 0
CONSTIPATION: 0
DIZZINESS: 1
DIARRHEA: 0
VOMITING: 0
LOSS OF CONSCIOUSNESS: 0

## 2017-10-24 ASSESSMENT — PAIN SCALES - GENERAL: PAINLEVEL: 3=SLIGHT PAIN

## 2017-10-24 NOTE — LETTER
October 24, 2017         Patient: Ladarius Soler   YOB: 1990   Date of Visit: 10/24/2017           To Whom it May Concern:    Ladarius Soler was seen in my clinic on 10/24/2017. Please excuse his absence today, 10/24/17.    If you have any questions or concerns, please don't hesitate to call.        Sincerely,           Debbie Lynne P.A.-C.  Electronically Signed

## 2017-10-24 NOTE — PROGRESS NOTES
"Subjective:      Ladarius Soler is a 27 y.o. male who presents with Polyuria (dizziness, nausea, x 2 weeks)            HPI   Patient presents to urgent care reporting urinary frequency and urgency x 2 weeks. He states he went to a music festival and was camping before the symptoms started, so he is concerned about a UTI. He denies history of UTIs. He denies any dysuria, hematuria, or urethral discharge. He does report he experienced some right-sided lower back pain that lasted for 2 days but has since resolved. No history of kidney stones. He also reports some intermittent abdominal pain described as \"sharp and shooting\", no specific area of pain. He states he has been dealing with intermittent dizziness and nausea x 2 weeks. No syncopal episodes or vomiting. He has no known medical problems and has not taken ant OTC medications for this problem. He denies any possibility for STD exposure.     Review of Systems   Constitutional: Negative for chills and fever.   HENT: Negative for ear pain and sore throat.    Respiratory: Negative for cough and shortness of breath.    Cardiovascular: Negative for chest pain.   Gastrointestinal: Positive for nausea. Negative for abdominal pain, constipation, diarrhea and vomiting.   Genitourinary: Negative.    Musculoskeletal: Negative.    Neurological: Positive for dizziness. Negative for loss of consciousness and headaches.      Objective:     /88   Pulse 63   Temp 36.8 °C (98.2 °F)   Resp 14   Ht 1.93 m (6' 4\")   Wt 96.6 kg (213 lb)   SpO2 97%   BMI 25.93 kg/m²      Physical Exam   Constitutional: He is oriented to person, place, and time. He appears well-developed and well-nourished. No distress.   HENT:   Head: Normocephalic and atraumatic.   Right Ear: Hearing, tympanic membrane, external ear and ear canal normal.   Left Ear: Hearing, tympanic membrane, external ear and ear canal normal.   Mouth/Throat: Oropharynx is clear and moist. No oropharyngeal exudate, " posterior oropharyngeal edema or posterior oropharyngeal erythema.   Eyes: Conjunctivae are normal. Pupils are equal, round, and reactive to light. Right eye exhibits no discharge. Left eye exhibits no discharge.   Neck: Normal range of motion.   Cardiovascular: Normal rate, regular rhythm and normal heart sounds.    No murmur heard.  Pulmonary/Chest: Effort normal and breath sounds normal. No respiratory distress. He has no wheezes.   Abdominal: Soft. Normal appearance and bowel sounds are normal. There is no tenderness. There is no rebound and no CVA tenderness.   Musculoskeletal: Normal range of motion.   Neurological: He is alert and oriented to person, place, and time.   Skin: Skin is warm and dry. He is not diaphoretic.   Psychiatric: He has a normal mood and affect. His behavior is normal.   Nursing note and vitals reviewed.          PMH:  has no past medical history on file.  MEDS:   Current Outpatient Prescriptions:   •  meclizine (ANTIVERT) 25 MG Tab, Take 1 Tab by mouth 3 times a day as needed for Dizziness., Disp: 30 Tab, Rfl: 0  •  lidocaine viscous 2% (XYLOCAINE) 2 % Solution, Take 5 mL by mouth as needed for Throat/Mouth Pain (q6hr PRN throat pain, ok to rinse and spit or swallow)., Disp: 120 mL, Rfl: 0  •  fluticasone (FLONASE) 50 MCG/ACT nasal spray, Spray 1 Spray in nose every day., Disp: 16 g, Rfl: 0  •  clarithromycin (BIAXIN) 500 MG Tab, Take 1 Tab by mouth 2 times a day., Disp: 20 Tab, Rfl: 0  •  Hydrocod Polst-CPM Polst ER (TUSSIONEX) 10-8 MG/5ML Suspension Extended Release, Take 5 mL by mouth every 12 hours., Disp: 140 mL, Rfl: 0  •  fluticasone (FLONASE) 50 MCG/ACT nasal spray, Spray 2 Sprays in nose every day., Disp: 1 Bottle, Rfl: 0  •  ibuprofen (MOTRIN) 800 MG TABS, Take 800 mg by mouth every 8 hours as needed., Disp: , Rfl:   •  naproxen (NAPROSYN) 500 MG TABS, Take 1 Tab by mouth 2 times a day, with meals., Disp: 60 Tab, Rfl: 0  ALLERGIES: No Known Allergies  SURGHX: History reviewed.  No pertinent surgical history.  SOCHX:  reports that he has never smoked. He has never used smokeless tobacco.  FH: family history is not on file.    POCT Urinalysis:  Component Results     Component Value Ref Range & Units Status   POC Color yellow Negative Final   POC Appearance cloudy Negative Final   POC Leukocyte Esterase neg Negative Final   POC Nitrites neg Negative Final   POC Urobiligen neg Negative (0.2) mg/dL Final   POC Protein neg Negative mg/dL Final   POC Urine PH 7.5 5.0 - 8.0 Final   POC Blood nwg Negative Final   POC Specific Gravity 1.005 <1.005 - >1.030 Final   POC Ketones neg Negative mg/dL Final   POC Biliruben neg Negative mg/dL Final   POC Glucose neg Negative mg/dL Final   Last Resulted Time   Tue Oct 24, 2017 10:55 AM     Assessment/Plan:     1. Dizziness  - meclizine (ANTIVERT) 25 MG Tab; Take 1 Tab by mouth 3 times a day as needed for Dizziness.  Dispense: 30 Tab; Refill: 0    2. Frequent urination  - POCT Urinalysis --> normal  - CHLAMYDIA/GC PCR URINE OR SWAB; Future    No sign of UTI on urinalysis at today's visit. Will screen for GC/Chlamydia given patient's urinary symptoms. Encouraged to increased fluids and monitor symptoms closely. Antivert given for patient to try for dizziness. He will follow up with his PCP for any ongoing symptoms of dizziness and nausea. Pending GC/Chlamydia results.

## 2017-10-30 ENCOUNTER — TELEPHONE (OUTPATIENT)
Dept: URGENT CARE | Facility: PHYSICIAN GROUP | Age: 27
End: 2017-10-30

## 2017-10-30 NOTE — TELEPHONE ENCOUNTER
Left message for patient informing him of negative GC/Chlamydia results. Advised to return my call with any questions or concerns.

## 2017-12-19 ENCOUNTER — HOSPITAL ENCOUNTER (OUTPATIENT)
Dept: RADIOLOGY | Facility: MEDICAL CENTER | Age: 27
End: 2017-12-19
Attending: GENERAL PRACTICE
Payer: COMMERCIAL

## 2017-12-19 DIAGNOSIS — R51.9 NONINTRACTABLE HEADACHE, UNSPECIFIED CHRONICITY PATTERN, UNSPECIFIED HEADACHE TYPE: ICD-10-CM

## 2017-12-19 PROCEDURE — 70450 CT HEAD/BRAIN W/O DYE: CPT

## 2018-01-22 ENCOUNTER — OFFICE VISIT (OUTPATIENT)
Dept: URGENT CARE | Facility: PHYSICIAN GROUP | Age: 28
End: 2018-01-22
Payer: COMMERCIAL

## 2018-01-22 VITALS
SYSTOLIC BLOOD PRESSURE: 134 MMHG | HEART RATE: 80 BPM | BODY MASS INDEX: 26.42 KG/M2 | RESPIRATION RATE: 18 BRPM | HEIGHT: 76 IN | WEIGHT: 217 LBS | OXYGEN SATURATION: 98 % | DIASTOLIC BLOOD PRESSURE: 82 MMHG | TEMPERATURE: 98.8 F

## 2018-01-22 DIAGNOSIS — R11.0 NAUSEA: ICD-10-CM

## 2018-01-22 DIAGNOSIS — F41.9 ANXIETY: ICD-10-CM

## 2018-01-22 PROCEDURE — 99214 OFFICE O/P EST MOD 30 MIN: CPT | Performed by: FAMILY MEDICINE

## 2018-01-22 RX ORDER — ONDANSETRON 4 MG/1
4 TABLET, ORALLY DISINTEGRATING ORAL EVERY 8 HOURS PRN
Qty: 10 TAB | Refills: 0 | Status: SHIPPED | OUTPATIENT
Start: 2018-01-22 | End: 2018-02-13

## 2018-01-22 RX ORDER — ALPRAZOLAM 1 MG/1
1 TABLET ORAL NIGHTLY PRN
Qty: 6 TAB | Refills: 0 | Status: SHIPPED | OUTPATIENT
Start: 2018-01-22 | End: 2018-01-25

## 2018-01-22 RX ORDER — ONDANSETRON 4 MG/1
4 TABLET, ORALLY DISINTEGRATING ORAL EVERY 8 HOURS PRN
Qty: 10 TAB | Refills: 0 | Status: SHIPPED | OUTPATIENT
Start: 2018-01-22 | End: 2018-01-22

## 2018-01-22 ASSESSMENT — ENCOUNTER SYMPTOMS
HEADACHES: 0
WEIGHT LOSS: 0
EYE REDNESS: 0
EYE DISCHARGE: 0

## 2018-01-22 NOTE — LETTER
January 22, 2018         Patient: Ladarius Soler   YOB: 1990   Date of Visit: 1/22/2018           To Whom it May Concern:    Ladarius Soler was seen in my clinic on 1/22/2018. Please excuse 1/22 and 1/23/2018.     Sincerely,           Domo Salgado M.D.  Electronically Signed

## 2018-01-22 NOTE — PROGRESS NOTES
"Subjective:      Ladarius Soler is a 27 y.o. male who presents with Dizziness (x 3 days ); Nausea (x 3 days ); and Anxiety            3 days vertigo with associated N/V. Positional. Initial congestion and rhinorrhea. Fatigue. Tinnitus. Anxiety. No diarrhea. No fever. No OTC medications. No other aggravating or alleviating factors.      tried epley maneuver without relif.     Review of Systems   Constitutional: Negative for weight loss.   Eyes: Negative for discharge and redness.        No visual field cuts.      Skin: Negative for itching and rash.   Neurological: Negative for headaches.        No ataxia      .  Medications, Allergies, and current problem list reviewed today in Epic  PMH anxiety with associated panic attack     Objective:     /82   Pulse 80   Temp 37.1 °C (98.8 °F)   Resp 18   Ht 1.93 m (6' 4\")   Wt 98.4 kg (217 lb)   SpO2 98%   BMI 26.41 kg/m²      Physical Exam   Constitutional: He appears well-developed and well-nourished.   HENT:   Head: Normocephalic and atraumatic.   Right Ear: External ear normal.   Left Ear: External ear normal.   Nasal congestion     Eyes: Conjunctivae are normal. Pupils are equal, round, and reactive to light.   Left lateral nystagmus   Neck: Neck supple.   Cardiovascular: Normal rate, regular rhythm and normal heart sounds.    Pulmonary/Chest: Effort normal and breath sounds normal.   Neurological:   Cerebellar intact  +edith hallpike to right               Assessment/Plan:     1. Positional vertigo of right ear     2. Nausea  ondansetron (ZOFRAN ODT) 4 MG TABLET DISPERSIBLE    DISCONTINUED: ondansetron (ZOFRAN ODT) 4 MG TABLET DISPERSIBLE   3. Anxiety  alprazolam (XANAX) 1 MG Tab     Differential diagnosis, natural history, supportive care, and indications for immediate follow-up discussed at length. '  Suspect labyrinthitis    "

## 2018-02-13 ENCOUNTER — APPOINTMENT (OUTPATIENT)
Dept: RADIOLOGY | Facility: MEDICAL CENTER | Age: 28
End: 2018-02-13
Attending: EMERGENCY MEDICINE
Payer: COMMERCIAL

## 2018-02-13 ENCOUNTER — HOSPITAL ENCOUNTER (EMERGENCY)
Facility: MEDICAL CENTER | Age: 28
End: 2018-02-14
Attending: EMERGENCY MEDICINE
Payer: COMMERCIAL

## 2018-02-13 ENCOUNTER — OFFICE VISIT (OUTPATIENT)
Dept: URGENT CARE | Facility: PHYSICIAN GROUP | Age: 28
End: 2018-02-13
Payer: COMMERCIAL

## 2018-02-13 VITALS
RESPIRATION RATE: 16 BRPM | DIASTOLIC BLOOD PRESSURE: 82 MMHG | OXYGEN SATURATION: 98 % | WEIGHT: 220 LBS | BODY MASS INDEX: 26.78 KG/M2 | TEMPERATURE: 99.3 F | SYSTOLIC BLOOD PRESSURE: 122 MMHG | HEART RATE: 80 BPM

## 2018-02-13 DIAGNOSIS — F41.9 ANXIETY: ICD-10-CM

## 2018-02-13 DIAGNOSIS — R00.2 PALPITATIONS: ICD-10-CM

## 2018-02-13 DIAGNOSIS — R20.0 ARM NUMBNESS: ICD-10-CM

## 2018-02-13 DIAGNOSIS — R42 CHRONIC VERTIGO: ICD-10-CM

## 2018-02-13 LAB
ALBUMIN SERPL BCP-MCNC: 4.6 G/DL (ref 3.2–4.9)
ALBUMIN/GLOB SERPL: 1.4 G/DL
ALP SERPL-CCNC: 95 U/L (ref 30–99)
ALT SERPL-CCNC: 39 U/L (ref 2–50)
ANION GAP SERPL CALC-SCNC: 10 MMOL/L (ref 0–11.9)
APTT PPP: 31.3 SEC (ref 24.7–36)
AST SERPL-CCNC: 21 U/L (ref 12–45)
BASOPHILS # BLD AUTO: 0.6 % (ref 0–1.8)
BASOPHILS # BLD: 0.05 K/UL (ref 0–0.12)
BILIRUB SERPL-MCNC: 0.6 MG/DL (ref 0.1–1.5)
BNP SERPL-MCNC: 3 PG/ML (ref 0–100)
BUN SERPL-MCNC: 14 MG/DL (ref 8–22)
CALCIUM SERPL-MCNC: 9.7 MG/DL (ref 8.5–10.5)
CHLORIDE SERPL-SCNC: 103 MMOL/L (ref 96–112)
CO2 SERPL-SCNC: 24 MMOL/L (ref 20–33)
CREAT SERPL-MCNC: 0.93 MG/DL (ref 0.5–1.4)
EKG IMPRESSION: NORMAL
EKG IMPRESSION: NORMAL
EOSINOPHIL # BLD AUTO: 0.19 K/UL (ref 0–0.51)
EOSINOPHIL NFR BLD: 2.3 % (ref 0–6.9)
ERYTHROCYTE [DISTWIDTH] IN BLOOD BY AUTOMATED COUNT: 39.2 FL (ref 35.9–50)
GLOBULIN SER CALC-MCNC: 3.4 G/DL (ref 1.9–3.5)
GLUCOSE SERPL-MCNC: 85 MG/DL (ref 65–99)
HCT VFR BLD AUTO: 48.6 % (ref 42–52)
HGB BLD-MCNC: 16.8 G/DL (ref 14–18)
IMM GRANULOCYTES # BLD AUTO: 0.02 K/UL (ref 0–0.11)
IMM GRANULOCYTES NFR BLD AUTO: 0.2 % (ref 0–0.9)
INR PPP: 0.96 (ref 0.87–1.13)
LIPASE SERPL-CCNC: 16 U/L (ref 11–82)
LYMPHOCYTES # BLD AUTO: 2.85 K/UL (ref 1–4.8)
LYMPHOCYTES NFR BLD: 33.9 % (ref 22–41)
MCH RBC QN AUTO: 31.2 PG (ref 27–33)
MCHC RBC AUTO-ENTMCNC: 34.6 G/DL (ref 33.7–35.3)
MCV RBC AUTO: 90.2 FL (ref 81.4–97.8)
MONOCYTES # BLD AUTO: 0.7 K/UL (ref 0–0.85)
MONOCYTES NFR BLD AUTO: 8.3 % (ref 0–13.4)
NEUTROPHILS # BLD AUTO: 4.6 K/UL (ref 1.82–7.42)
NEUTROPHILS NFR BLD: 54.7 % (ref 44–72)
NRBC # BLD AUTO: 0 K/UL
NRBC BLD-RTO: 0 /100 WBC
PLATELET # BLD AUTO: 245 K/UL (ref 164–446)
PMV BLD AUTO: 10.3 FL (ref 9–12.9)
POTASSIUM SERPL-SCNC: 4.1 MMOL/L (ref 3.6–5.5)
PROT SERPL-MCNC: 8 G/DL (ref 6–8.2)
PROTHROMBIN TIME: 12.5 SEC (ref 12–14.6)
RBC # BLD AUTO: 5.39 M/UL (ref 4.7–6.1)
SODIUM SERPL-SCNC: 137 MMOL/L (ref 135–145)
TROPONIN I SERPL-MCNC: <0.01 NG/ML (ref 0–0.04)
WBC # BLD AUTO: 8.4 K/UL (ref 4.8–10.8)

## 2018-02-13 PROCEDURE — 80053 COMPREHEN METABOLIC PANEL: CPT

## 2018-02-13 PROCEDURE — 85610 PROTHROMBIN TIME: CPT

## 2018-02-13 PROCEDURE — 85730 THROMBOPLASTIN TIME PARTIAL: CPT

## 2018-02-13 PROCEDURE — 93005 ELECTROCARDIOGRAM TRACING: CPT

## 2018-02-13 PROCEDURE — 83880 ASSAY OF NATRIURETIC PEPTIDE: CPT

## 2018-02-13 PROCEDURE — 84484 ASSAY OF TROPONIN QUANT: CPT

## 2018-02-13 PROCEDURE — 83690 ASSAY OF LIPASE: CPT

## 2018-02-13 PROCEDURE — 71045 X-RAY EXAM CHEST 1 VIEW: CPT

## 2018-02-13 PROCEDURE — 93005 ELECTROCARDIOGRAM TRACING: CPT | Performed by: EMERGENCY MEDICINE

## 2018-02-13 PROCEDURE — 36415 COLL VENOUS BLD VENIPUNCTURE: CPT

## 2018-02-13 PROCEDURE — 99213 OFFICE O/P EST LOW 20 MIN: CPT | Performed by: EMERGENCY MEDICINE

## 2018-02-13 PROCEDURE — 99284 EMERGENCY DEPT VISIT MOD MDM: CPT

## 2018-02-13 PROCEDURE — 85025 COMPLETE CBC W/AUTO DIFF WBC: CPT

## 2018-02-13 RX ORDER — ALPRAZOLAM 1 MG/1
1 TABLET ORAL NIGHTLY PRN
COMMUNITY
End: 2021-05-03

## 2018-02-13 ASSESSMENT — ENCOUNTER SYMPTOMS
ABDOMINAL PAIN: 0
SENSORY CHANGE: 1
NECK PAIN: 0
CHILLS: 0
PALPITATIONS: 0
FEVER: 0
VOMITING: 0
COUGH: 0
DIZZINESS: 1
FOCAL WEAKNESS: 0
BLURRED VISION: 0
NAUSEA: 0
NERVOUS/ANXIOUS: 1
DOUBLE VISION: 0

## 2018-02-13 ASSESSMENT — PAIN SCALES - GENERAL
PAINLEVEL_OUTOF10: 1
PAINLEVEL_OUTOF10: 1

## 2018-02-13 ASSESSMENT — LIFESTYLE VARIABLES: DO YOU DRINK ALCOHOL: NO

## 2018-02-13 NOTE — LETTER
[facility], EMERGENCY DEPT  [address]  [place] [address]  [phone]     February 14, 2018    Patient: Ladarius Soler   YOB: 1990   Date of Visit: 2/13/2018       To Whom It May Concern:    Ladarius Soler was seen and treated in our department on 2/13/2018.     Sincerely,     Shanti Goodwin R.N.

## 2018-02-13 NOTE — LETTER
February 13, 2018        Ladarius Soler  [address]  [place] [address]        Dear Ladarius:    Please ask to be excused from work today and tomorrow for medical reasons.    If you have any questions or concerns, please don't hesitate to call.        Sincerely,        Santi Gamez M.D.    Electronically Signed

## 2018-02-14 VITALS
DIASTOLIC BLOOD PRESSURE: 90 MMHG | HEART RATE: 60 BPM | BODY MASS INDEX: 26.93 KG/M2 | WEIGHT: 221.12 LBS | RESPIRATION RATE: 19 BRPM | SYSTOLIC BLOOD PRESSURE: 150 MMHG | OXYGEN SATURATION: 96 % | HEIGHT: 76 IN | TEMPERATURE: 96.9 F

## 2018-02-14 LAB — TROPONIN I SERPL-MCNC: <0.01 NG/ML (ref 0–0.04)

## 2018-02-14 NOTE — ED PROVIDER NOTES
"ED Provider Note    Scribed for Alex Smith M.D. by Cornelio Cox. 2/13/2018, 11:15 PM.    Primary care provider: Pcp Pt States None  Means of arrival: walk-in  History obtained from: Patient  History limited by: none    CHIEF COMPLAINT  Chief Complaint   Patient presents with   • Chest Pain     \"I felt hiccup in chest and then became really sweaty and my arm and face became numb/tingly\"   • Nausea       HPI  Ladarius Soler is a 27 y.o. male who presents to the Emergency Department for evaluation of acute chest pain onset today. Patient earlier today, he was driving when he felt a \"hiccup\" in his chest causing a brief episode of left sided chest pain that \"fluttered\" across his left chest. He states he pulled over and had a sudden onset of nausea, diaphoresis, and left arm tingling. His arm tingling then radiated up his arm into his left chest. Patient reports his nausea lasted for about 20 minutes. The patient states he has not experienced chest pain since, but his arm tingling persisted until arrival to the ED. Patient reports he was seen at Urgent Care today, and he was told by the physician his symptoms may be attributed to anxiety. The patient states his symptoms today were much different than symptoms he has experienced with anxiety in the past, but his current symptoms are different, prompting the urgent care physician to send him to the ED. Patient denies any cardiac history, but states he is currently being evaluated by an audiologist for intermittent vertigo. The patient notes he has been taking Xanax for his vertigo, but the Xanax today did not improve his symptoms. He confirms he does not drink caffeine. The patient does not report any recent falls or trauma. He denies any illicit drug use. Patient denies shortness of breath, vomiting, abdominal pain, syncope.     REVIEW OF SYSTEMS  See HPI for further details. All other systems are negative. C.       PAST MEDICAL HISTORY   Vertigo    SURGICAL " "HISTORY  patient denies any surgical history    SOCIAL HISTORY  Social History   Substance Use Topics   • Smoking status: Former Smoker     Years: 3.00   • Smokeless tobacco: Never Used   • Alcohol use Yes      Comment: occasional      History   Drug Use     Comment: marijuana occ       FAMILY HISTORY  History reviewed. No pertinent family history.    CURRENT MEDICATIONS  Reviewed.  See Encounter Summary.     ALLERGIES  No Known Allergies    PHYSICAL EXAM  VITAL SIGNS: /90   Pulse 63   Temp 36.1 °C (96.9 °F)   Resp 18   Ht 1.93 m (6' 4\")   Wt 100.3 kg (221 lb 1.9 oz)   SpO2 99%   BMI 26.92 kg/m²   Constitutional: Alert in no apparent distress.  HENT: No signs of trauma, Bilateral external ears normal, Nose normal.   Eyes: Pupils are equal and reactive, Conjunctiva normal, Non-icteric.   Neck: Normal range of motion, No tenderness, Supple, No stridor.   Lymphatic: No lymphadenopathy noted.   Cardiovascular: Regular rate and rhythm, no murmurs.   Thorax & Lungs: Normal breath sounds, No respiratory distress, No wheezing, No chest tenderness.   Abdomen: Bowel sounds normal, Soft, No tenderness, No masses, No pulsatile masses. No peritoneal signs.  Skin: Warm, Dry, No erythema, No rash.   Back: No bony tenderness, No CVA tenderness.   Extremities: Intact distal pulses, No edema, No tenderness, No cyanosis  Musculoskeletal: Good range of motion in all major joints. No tenderness to palpation or major deformities noted.   Neurologic: Alert , Normal motor function, Normal sensory function, No focal deficits noted.   Psychiatric: Affect normal, Judgment normal, Mood normal.     DIAGNOSTIC STUDIES / PROCEDURES     LABS  Results for orders placed or performed during the hospital encounter of 02/13/18   Troponin   Result Value Ref Range    Troponin I <0.01 0.00 - 0.04 ng/mL   Btype Natriuretic Peptide   Result Value Ref Range    B Natriuretic Peptide 3 0 - 100 pg/mL   CBC with Differential   Result Value Ref Range "    WBC 8.4 4.8 - 10.8 K/uL    RBC 5.39 4.70 - 6.10 M/uL    Hemoglobin 16.8 14.0 - 18.0 g/dL    Hematocrit 48.6 42.0 - 52.0 %    MCV 90.2 81.4 - 97.8 fL    MCH 31.2 27.0 - 33.0 pg    MCHC 34.6 33.7 - 35.3 g/dL    RDW 39.2 35.9 - 50.0 fL    Platelet Count 245 164 - 446 K/uL    MPV 10.3 9.0 - 12.9 fL    Neutrophils-Polys 54.70 44.00 - 72.00 %    Lymphocytes 33.90 22.00 - 41.00 %    Monocytes 8.30 0.00 - 13.40 %    Eosinophils 2.30 0.00 - 6.90 %    Basophils 0.60 0.00 - 1.80 %    Immature Granulocytes 0.20 0.00 - 0.90 %    Nucleated RBC 0.00 /100 WBC    Neutrophils (Absolute) 4.60 1.82 - 7.42 K/uL    Lymphs (Absolute) 2.85 1.00 - 4.80 K/uL    Monos (Absolute) 0.70 0.00 - 0.85 K/uL    Eos (Absolute) 0.19 0.00 - 0.51 K/uL    Baso (Absolute) 0.05 0.00 - 0.12 K/uL    Immature Granulocytes (abs) 0.02 0.00 - 0.11 K/uL    NRBC (Absolute) 0.00 K/uL   Complete Metabolic Panel (CMP)   Result Value Ref Range    Sodium 137 135 - 145 mmol/L    Potassium 4.1 3.6 - 5.5 mmol/L    Chloride 103 96 - 112 mmol/L    Co2 24 20 - 33 mmol/L    Anion Gap 10.0 0.0 - 11.9    Glucose 85 65 - 99 mg/dL    Bun 14 8 - 22 mg/dL    Creatinine 0.93 0.50 - 1.40 mg/dL    Calcium 9.7 8.5 - 10.5 mg/dL    AST(SGOT) 21 12 - 45 U/L    ALT(SGPT) 39 2 - 50 U/L    Alkaline Phosphatase 95 30 - 99 U/L    Total Bilirubin 0.6 0.1 - 1.5 mg/dL    Albumin 4.6 3.2 - 4.9 g/dL    Total Protein 8.0 6.0 - 8.2 g/dL    Globulin 3.4 1.9 - 3.5 g/dL    A-G Ratio 1.4 g/dL   Prothrombin Time   Result Value Ref Range    PT 12.5 12.0 - 14.6 sec    INR 0.96 0.87 - 1.13   APTT   Result Value Ref Range    APTT 31.3 24.7 - 36.0 sec   Lipase   Result Value Ref Range    Lipase 16 11 - 82 U/L   ESTIMATED GFR   Result Value Ref Range    GFR If African American >60 >60 mL/min/1.73 m 2    GFR If Non African American >60 >60 mL/min/1.73 m 2   Troponin STAT   Result Value Ref Range    Troponin I <0.01 0.00 - 0.04 ng/mL   EKG (NOW)   Result Value Ref Range    Report       [facility]  [place] [facility]    Test Date:  2018  Pt Name:    EPIFANIO HAAS             Department: ER  MRN:        [id number]                      Room:  Gender:     Male                         Technician: [id number]  :        1990                   Requested By:ER TRIAGE PROTOCOL  Order #:    [id number]                    Reading MD:    Measurements  Intervals                                Axis  Rate:       70                           P:          59  OR:         176                          QRS:        66  QRSD:       86                           T:          38  QT:         380  QTc:        410    Interpretive Statements  SINUS RHYTHM  No previous ECG available for comparison     EKG (ER)   Result Value Ref Range    Report       [facility] Emergency Dept.    Test Date:  2018  Pt Name:    EPIFANIO HAAS             Department: ER  MRN:        [id number]                      Room:  Gender:     Male                         Technician: [id number]  :        1990                   Requested By:ER TRIAGE PROTOCOL  Order #:    [id number]                    Reading MD:    Measurements  Intervals                                Axis  Rate:       59                           P:          49  OR:         164                          QRS:        53  QRSD:       82                           T:          32  QT:         388  QTc:        385    Interpretive Statements  SINUS BRADYCARDIA  Compared to ECG 2018 19:52:03  Sinus rhythm no longer present        All labs were reviewed by me.      EKG  12 Lead EKG interpreted by me to show:  Performed at 195  Sinus rhythm  Rate 70  Axis: Normal  Intervals: Normal  No acute ST-T wave changes  No prior EKG for comparison      Repeat EKG  Performed at   12 Lead EKG interpreted by me to show:  Sinus bradycardia  Rate 59  Axis: Normal  Intervals: Normal  No acute ST-T wave changes  No change from prior EKG from earlier  today        RADIOLOGY  DX-CHEST-LIMITED (1 VIEW)   Final Result         No acute cardiopulmonary abnormalities are identified.        The radiologist's interpretation of all radiological studies and images have been reviewed by me.    COURSE & MEDICAL DECISION MAKING  Pertinent Labs & Imaging studies reviewed. (See chart for details)      7:52 PM - Ordered chest x-ray, estimated GFR, Troponin, BNP, CBC with differential, CMP, PTT, APTT, Lipase, EKG per protocol.     11:15 PM - Patient seen and examined at bedside. The patient was updated on his lab and imaging results as above. Ordered delta Troponin, repeat EKG to further evaluate his symptoms.  If patient's delta troponin is normal, he will be discharged and was instructed to follow-up with cardiology as an outpatient. I discussed the plan of care as above with the patient. He understood and verbalized agreement.    12:18 AM - heart score 0      12:23 AM - I reevaluated the patient at bedside. Patient is asymptomatic upon my reassessment. Updated the patient on his lab and EKG results, which are unremarkable. Patient was advised to follow-up with cardiology, for which I will provide contact information.     Decision Making:  This is a 27 y.o. year old male who presents with above complaint. Patient asymptomatic throughout his observation here now. Heart score 0. Delta troponin delta EKG showed no interval change and no worrisome findings. Patient currently being evaluated and treated for vertigo. I have asked him to continue with his neurologic exploration however have additionally given him referral to cardiology given his event today which if a cardiac etiology was at plate may also be fitting with some of the symptomatology as has been evaluated as possible vertigo. He is understanding strict return precautions and will follow up with cardiology ASAP.    The patient will return for new or worsening symptoms and is stable at the time of discharge.    The  patient is referred to a primary physician for blood pressure management, diabetic screening, and for all other preventative health concerns.      DISPOSITION:  Patient will be discharged home in stable condition.    FOLLOW UP:  [facility] CARDIOLOGY [address] [address]    Schedule an appointment as soon as possible for a visit      [facility], Emergency Dept  [address] [facility] [facility] [address] [phone]    If symptoms worsen, recur, or change.       FINAL IMPRESSION  1. Palpitations    2. Anxiety          Cornelio ROBERTS (Scribe), am scribing for, and in the presence of, Alex Smith M.D..    Electronically signed by: Cornelio Cox (Scribe), 2/13/2018    Alex ROBERTS M.D. personally performed the services described in this documentation, as scribed by Cornelio Cox in my presence, and it is both accurate and complete.    The note accurately reflects work and decisions made by me.  Alex Smith  2/14/2018  4:25 AM

## 2018-02-14 NOTE — DISCHARGE INSTRUCTIONS
Palpitations  A palpitation is the feeling that your heartbeat is irregular or is faster than normal. It may feel like your heart is fluttering or skipping a beat. Palpitations are usually not a serious problem. However, in some cases, you may need further medical evaluation.  CAUSES   Palpitations can be caused by:  · Smoking.  · Caffeine or other stimulants, such as diet pills or energy drinks.  · Alcohol.  · Stress and anxiety.  · Strenuous physical activity.  · Fatigue.  · Certain medicines.  · Heart disease, especially if you have a history of irregular heart rhythms (arrhythmias), such as atrial fibrillation, atrial flutter, or supraventricular tachycardia.  · An improperly working pacemaker or defibrillator.  DIAGNOSIS   To find the cause of your palpitations, your health care provider will take your medical history and perform a physical exam. Your health care provider may also have you take a test called an ambulatory electrocardiogram (ECG). An ECG records your heartbeat patterns over a 24-hour period. You may also have other tests, such as:  · Transthoracic echocardiogram (TTE). During echocardiography, sound waves are used to evaluate how blood flows through your heart.  · Transesophageal echocardiogram (EMMA).  · Cardiac monitoring. This allows your health care provider to monitor your heart rate and rhythm in real time.  · Holter monitor. This is a portable device that records your heartbeat and can help diagnose heart arrhythmias. It allows your health care provider to track your heart activity for several days, if needed.  · Stress tests by exercise or by giving medicine that makes the heart beat faster.  TREATMENT   Treatment of palpitations depends on the cause of your symptoms and can vary greatly. Most cases of palpitations do not require any treatment other than time, relaxation, and monitoring your symptoms. Other causes, such as atrial fibrillation, atrial flutter, or supraventricular  tachycardia, usually require further treatment.  HOME CARE INSTRUCTIONS   · Avoid:  ¨ Caffeinated coffee, tea, soft drinks, diet pills, and energy drinks.  ¨ Chocolate.  ¨ Alcohol.  · Stop smoking if you smoke.  · Reduce your stress and anxiety. Things that can help you relax include:  ¨ A method of controlling things in your body, such as your heartbeats, with your mind (biofeedback).  ¨ Yoga.  ¨ Meditation.  ¨ Physical activity such as swimming, jogging, or walking.  · Get plenty of rest and sleep.  SEEK MEDICAL CARE IF:   · You continue to have a fast or irregular heartbeat beyond 24 hours.  · Your palpitations occur more often.  SEEK IMMEDIATE MEDICAL CARE IF:  · You have chest pain or shortness of breath.  · You have a severe headache.  · You feel dizzy or you faint.  MAKE SURE YOU:  · Understand these instructions.  · Will watch your condition.  · Will get help right away if you are not doing well or get worse.     This information is not intended to replace advice given to you by your health care provider. Make sure you discuss any questions you have with your health care provider.     Document Released: 12/15/2001 Document Revised: 12/23/2014 Document Reviewed: 02/15/2013  Elsevier Interactive Patient Education ©2016 [employer]

## 2018-02-14 NOTE — PROGRESS NOTES
"Subjective:      Ladarius Soler is a 27 y.o. male who presents with   Complaint of hiccup in his chest and left arm numbness.  HPI  Patient is an 27-year-old male complaining of transient episode of \"hiccup\" in his chest that occurred while driving with associated left arm numbness and tingling in both hands. Patient took a Xanax which he been given for his vertigo. He states that this helped his symptoms.  Patient is currently being evaluated for vertigo and anxiety.  No Known Allergies   Social History     Social History   • Marital status: Single     Spouse name: N/A   • Number of children: N/A   • Years of education: N/A     Occupational History   • Not on file.     Social History Main Topics   • Smoking status: Never Smoker   • Smokeless tobacco: Never Used   • Alcohol use Not on file   • Drug use: Unknown   • Sexual activity: Not on file     Other Topics Concern   • Not on file     Social History Narrative   • No narrative on file   No past medical history on file.   Current Outpatient Prescriptions on File Prior to Visit   Medication Sig Dispense Refill   • ondansetron (ZOFRAN ODT) 4 MG TABLET DISPERSIBLE Take 1 Tab by mouth every 8 hours as needed. 10 Tab 0   • meclizine (ANTIVERT) 25 MG Tab Take 1 Tab by mouth 3 times a day as needed for Dizziness. 30 Tab 0   • lidocaine viscous 2% (XYLOCAINE) 2 % Solution Take 5 mL by mouth as needed for Throat/Mouth Pain (q6hr PRN throat pain, ok to rinse and spit or swallow). 120 mL 0   • fluticasone (FLONASE) 50 MCG/ACT nasal spray Spray 1 Spray in nose every day. 16 g 0   • clarithromycin (BIAXIN) 500 MG Tab Take 1 Tab by mouth 2 times a day. 20 Tab 0   • Hydrocod Polst-CPM Polst ER (TUSSIONEX) 10-8 MG/5ML Suspension Extended Release Take 5 mL by mouth every 12 hours. 140 mL 0   • fluticasone (FLONASE) 50 MCG/ACT nasal spray Spray 2 Sprays in nose every day. 1 Bottle 0   • ibuprofen (MOTRIN) 800 MG TABS Take 800 mg by mouth every 8 hours as needed.     • naproxen " (NAPROSYN) 500 MG TABS Take 1 Tab by mouth 2 times a day, with meals. 60 Tab 0     No current facility-administered medications on file prior to visit.    No family history on file.  Review of Systems   Constitutional: Negative for chills and fever.   HENT: Negative for hearing loss.    Eyes: Negative for blurred vision and double vision.   Respiratory: Negative for cough.         Patient complained of hiccup recurred today while driving. Patient states this was a transient event.   Cardiovascular: Negative for chest pain and palpitations.   Gastrointestinal: Negative for abdominal pain, nausea and vomiting.   Musculoskeletal: Negative for neck pain.   Skin: Negative for rash.   Neurological: Positive for dizziness and sensory change. Negative for focal weakness.        Patient currently being evaluated for chronic vertigo/dizziness. This trying to arrange for an MRI through Dr. Arevalo   Psychiatric/Behavioral: The patient is nervous/anxious.           Objective:     /82   Pulse 80   Temp 37.4 °C (99.3 °F)   Resp 16   Wt 99.8 kg (220 lb)   SpO2 98%   BMI 26.78 kg/m²      Physical Exam   Constitutional: He appears well-developed and well-nourished. No distress.   HENT:   Head: Normocephalic and atraumatic.   Right Ear: External ear normal.   Left Ear: External ear normal.   Nose: Nose normal.   TMs are normal canals clear. Patient has horizontal my status with change in positions. 3 beats in both directions.   Eyes: Conjunctivae and EOM are normal. Pupils are equal, round, and reactive to light.   Cardiovascular: Normal rate.    Pulmonary/Chest: Effort normal and breath sounds normal. No respiratory distress.   Abdominal: Soft. He exhibits no distension.   Musculoskeletal: Normal range of motion.   Patient has full range of motion in both upper and lower extremities. Range of motion on the left upper extremity is full shoulder elbow wrist and hand with 5+ over 5 strength sensation is intact. Patient  subjectively complains of tingling in both hands.   Neurological: He is alert. He displays normal reflexes. No cranial nerve deficit. He exhibits normal muscle tone. Coordination normal.   Skin: Skin is warm and dry.   Psychiatric: He has a normal mood and affect. His behavior is normal.     Physical Exam   Constitutional: He appears well-developed and well-nourished. No distress.   HENT:   Head: Normocephalic and atraumatic.   Right Ear: External ear normal.   Left Ear: External ear normal.   Nose: Nose normal.   TMs are normal canals clear. Patient has horizontal my status with change in positions. 3 beats in both directions.   Eyes: Conjunctivae and EOM are normal. Pupils are equal, round, and reactive to light.   Cardiovascular: Normal rate.    Pulmonary/Chest: Effort normal and breath sounds normal. No respiratory distress.   Abdominal: Soft. He exhibits no distension.   Musculoskeletal: Normal range of motion.   Patient has full range of motion in both upper and lower extremities. Range of motion on the left upper extremity is full shoulder elbow wrist and hand with 5+ over 5 strength sensation is intact. Patient subjectively complains of tingling in both hands.   Neurological: He is alert. He displays normal reflexes. No cranial nerve deficit. He exhibits normal muscle tone. Coordination normal.   Skin: Skin is warm and dry.   Psychiatric: He has a normal mood and affect. His behavior is normal.          Assessment/Plan:     Diagnosis acute arm numbness.    Anxiety (chronic)    Patient denies any suicidal ideation, also denies depression. He asked me who would he see for anxiety and I recommended psychiatry he said that he be given that recommendation in the past as well as, offered SSRI medications by his [facility] service physician. This recommendation is chosen against.     I explained to the patient that. I felt he would be best evaluated in the emergency department since part of his current workup is  trying to schedule an MRI of his brain. We do not have those capabilities in the urgent care at this time of day nor do we have the ability to any lab tests at this time.      I also recommended that he not drive while taking Xanax. He showed me his bottle which specifically says beware of using this medicine while operating equipment..   I spent 20 minutes explaining to him the side effects of somnolence is  23 -70% rate and that the half life was 11.2 hours.  Patient was given a work note/school note for the next 2 days off. -Recommended he not drive at this time while taking Xanax.

## 2018-02-14 NOTE — ED NOTES
Pt ambulatory to [facility].  Agree with triage assessment.  Pt changed into gown.  Chart up for ERP.

## 2018-02-14 NOTE — ED TRIAGE NOTES
"  Chief Complaint   Patient presents with   • Chest Pain     \"I felt hiccup in chest and then became really sweaty and my arm and face became numb/tingly\"   • Nausea     Ambulatory to triage for above.  Went in to PCP after event happened while driving today at 1300, sent here by MD. No EKG obtained by PCP.  Patient denies cardiac history, but states he was supposed to follow up with cardiologist for intermittent chest pain.  Patient continues to experience tingling sensation to left arm/face.    Protocol initiated. EKG obtained.    Explained wait time and triage process to pt. Pt placed back out in lobby, told to notify ED tech or triage RN of any changes, verbalized understanding.    Blood pressure 150/90, pulse 63, temperature 36.1 °C (96.9 °F), resp. rate 18, height 1.93 m (6' 4\"), weight 100.3 kg (221 lb 1.9 oz), SpO2 99 %.      "

## 2018-03-20 ENCOUNTER — HOSPITAL ENCOUNTER (OUTPATIENT)
Dept: HOSPITAL 8 - CFH | Age: 28
Discharge: HOME | End: 2018-03-20
Attending: OTOLARYNGOLOGY
Payer: COMMERCIAL

## 2018-03-20 DIAGNOSIS — H81.20: ICD-10-CM

## 2018-03-20 DIAGNOSIS — G43.101: Primary | ICD-10-CM

## 2018-03-20 PROCEDURE — A9585 GADOBUTROL INJECTION: HCPCS

## 2018-03-20 PROCEDURE — 70553 MRI BRAIN STEM W/O & W/DYE: CPT

## 2018-12-20 ENCOUNTER — OFFICE VISIT (OUTPATIENT)
Dept: URGENT CARE | Facility: PHYSICIAN GROUP | Age: 28
End: 2018-12-20
Payer: COMMERCIAL

## 2018-12-20 VITALS
TEMPERATURE: 98.9 F | OXYGEN SATURATION: 97 % | SYSTOLIC BLOOD PRESSURE: 110 MMHG | DIASTOLIC BLOOD PRESSURE: 68 MMHG | HEART RATE: 64 BPM | BODY MASS INDEX: 26.18 KG/M2 | HEIGHT: 76 IN | WEIGHT: 215 LBS

## 2018-12-20 DIAGNOSIS — R10.9 ABDOMINAL BLOATING WITH CRAMPS: ICD-10-CM

## 2018-12-20 DIAGNOSIS — R14.0 ABDOMINAL BLOATING WITH CRAMPS: ICD-10-CM

## 2018-12-20 PROCEDURE — 99214 OFFICE O/P EST MOD 30 MIN: CPT | Performed by: PHYSICIAN ASSISTANT

## 2018-12-25 ASSESSMENT — ENCOUNTER SYMPTOMS
BLOOD IN STOOL: 0
BELCHING: 1
DIARRHEA: 1
CONSTIPATION: 0
HEARTBURN: 0
HEMATOCHEZIA: 0
ABDOMINAL PAIN: 1
FEVER: 0
VOMITING: 0
WEIGHT LOSS: 0
FLATUS: 1
NAUSEA: 0

## 2018-12-25 ASSESSMENT — CROHNS DISEASE ACTIVITY INDEX (CDAI): CDAI SCORE: 0

## 2018-12-25 NOTE — PROGRESS NOTES
"Subjective:      Ladarius Soler is a 28 y.o. male who presents with Abdominal Pain (Have been taking probiotics for x2 months from food poisoning, cramping on sides, abnormal bms )    PMH: Reviewed with patient/family member/EPIC.    MEDS:   Current Outpatient Prescriptions:   •  ALPRAZolam (XANAX) 1 MG Tab, Take 1 mg by mouth at bedtime as needed for Sleep., Disp: , Rfl:   •  meclizine (ANTIVERT) 25 MG Tab, Take 1 Tab by mouth 3 times a day as needed for Dizziness. (Patient not taking: Reported on 12/20/2018), Disp: 30 Tab, Rfl: 0  •  fluticasone (FLONASE) 50 MCG/ACT nasal spray, Spray 1 Spray in nose every day. (Patient not taking: Reported on 12/20/2018), Disp: 16 g, Rfl: 0  •  Hydrocod Polst-CPM Polst ER (TUSSIONEX) 10-8 MG/5ML Suspension Extended Release, Take 5 mL by mouth every 12 hours. (Patient not taking: Reported on 12/20/2018), Disp: 140 mL, Rfl: 0  •  ibuprofen (MOTRIN) 800 MG TABS, Take 800 mg by mouth every 8 hours as needed., Disp: , Rfl:   ALLERGIES: No Known Allergies  SURGHX: History reviewed. No pertinent surgical history.  SOCHX:  reports that he has quit smoking. He quit after 3.00 years of use. He has never used smokeless tobacco. He reports that he drinks alcohol. He reports that he uses drugs.  FH: Reviewed with patient, not pertinent to this visit.           Patient presents with:  Abdominal Pain: Have been taking probiotics for x2 months from food poisoning, cramping on sides, abnormal bms (not diarrhea or constipation, but waxes and wanes somewhere in between the two), a lot of gas.  Pt states he eats a very high fiber, healthy diet and has added kambucha in the last 2 months as someone said it would help get his gut \" back to normal\".  Pt denies fever chills, bloody diarrhea.            Abdominal Pain   This is a new problem. The current episode started more than 1 month ago. The onset quality is undetermined. The problem occurs intermittently. The problem has been waxing and waning. " "The pain is located in the generalized abdominal region. The pain is at a severity of 3/10. The quality of the pain is a sensation of fullness, aching and cramping. The abdominal pain does not radiate. Associated symptoms include belching, diarrhea and flatus. Pertinent negatives include no constipation, dysuria, fever, hematochezia, melena, nausea, vomiting or weight loss. Exacerbated by: unsure. The pain is relieved by bowel movements, passing flatus and belching. Treatments tried: probiotics, high fiber diet. The treatment provided mild relief. There is no history of colon cancer, Crohn's disease, GERD, irritable bowel syndrome, PUD or ulcerative colitis.       Review of Systems   Constitutional: Negative for fever and weight loss.   Gastrointestinal: Positive for abdominal pain, diarrhea and flatus. Negative for blood in stool, constipation, heartburn, hematochezia, melena, nausea and vomiting.   Genitourinary: Negative.  Negative for dysuria.   All other systems reviewed and are negative.         Objective:     /68 (BP Location: Left arm, Patient Position: Sitting, BP Cuff Size: Adult)   Pulse 64   Temp 37.2 °C (98.9 °F) (Temporal)   Ht 1.93 m (6' 4\")   Wt 97.5 kg (215 lb)   SpO2 97%   BMI 26.17 kg/m²      Physical Exam   Constitutional: He is oriented to person, place, and time. He appears well-developed and well-nourished. No distress.   HENT:   Head: Normocephalic and atraumatic.   Nose: Nose normal.   Mouth/Throat: Oropharynx is clear and moist.   Eyes: Pupils are equal, round, and reactive to light. Conjunctivae and EOM are normal.   Neck: Normal range of motion. Neck supple.   Cardiovascular: Regular rhythm and normal heart sounds.    Pulmonary/Chest: Effort normal and breath sounds normal.   Abdominal: Soft. Bowel sounds are increased. There is no tenderness. There is no rigidity, no rebound, no guarding, no tenderness at McBurney's point and negative Patterson's sign. No hernia. "   Musculoskeletal: Normal range of motion.   Neurological: He is alert and oriented to person, place, and time. Gait normal.   Skin: Skin is warm and dry. Capillary refill takes less than 2 seconds.   Psychiatric: He has a normal mood and affect.   Nursing note and vitals reviewed.              Assessment/Plan:     1. Abdominal bloating with cramps       I encouraged the patient to slowly reduce the probiotics and maybe eliminate the kambucha until his bowels stabilize.     If no change in 2-4 weeks, pt to follow up with PCP or GI. Pt declined GI referral at this time.     Discussed red flags and reasons to return to UC or ED.  Pt and/or family verbalized understanding of diagnosis and follow up instructions and was offered informational handout on diagnosis.  PT discharged.

## 2019-05-31 ENCOUNTER — OFFICE VISIT (OUTPATIENT)
Dept: URGENT CARE | Facility: PHYSICIAN GROUP | Age: 29
End: 2019-05-31
Payer: COMMERCIAL

## 2019-05-31 VITALS
SYSTOLIC BLOOD PRESSURE: 114 MMHG | OXYGEN SATURATION: 96 % | RESPIRATION RATE: 16 BRPM | HEART RATE: 70 BPM | TEMPERATURE: 97.8 F | WEIGHT: 210 LBS | BODY MASS INDEX: 25.57 KG/M2 | DIASTOLIC BLOOD PRESSURE: 72 MMHG | HEIGHT: 76 IN

## 2019-05-31 DIAGNOSIS — S39.011A STRAIN OF ABDOMINAL MUSCLE, INITIAL ENCOUNTER: ICD-10-CM

## 2019-05-31 LAB
APPEARANCE UR: CLEAR
BILIRUB UR STRIP-MCNC: NORMAL MG/DL
COLOR UR AUTO: YELLOW
GLUCOSE UR STRIP.AUTO-MCNC: NORMAL MG/DL
KETONES UR STRIP.AUTO-MCNC: NORMAL MG/DL
LEUKOCYTE ESTERASE UR QL STRIP.AUTO: NORMAL
NITRITE UR QL STRIP.AUTO: NORMAL
PH UR STRIP.AUTO: 7 [PH] (ref 5–8)
PROT UR QL STRIP: NORMAL MG/DL
RBC UR QL AUTO: NORMAL
SP GR UR STRIP.AUTO: 1.02
UROBILINOGEN UR STRIP-MCNC: 1 MG/DL

## 2019-05-31 PROCEDURE — 81002 URINALYSIS NONAUTO W/O SCOPE: CPT | Performed by: PHYSICIAN ASSISTANT

## 2019-05-31 PROCEDURE — 99214 OFFICE O/P EST MOD 30 MIN: CPT | Performed by: PHYSICIAN ASSISTANT

## 2019-05-31 RX ORDER — NAPROXEN 500 MG/1
500 TABLET ORAL 2 TIMES DAILY WITH MEALS
Qty: 30 TAB | Refills: 0 | Status: SHIPPED | OUTPATIENT
Start: 2019-05-31 | End: 2021-05-03

## 2019-05-31 ASSESSMENT — ENCOUNTER SYMPTOMS
ANOREXIA: 0
DIARRHEA: 0
FEVER: 0
ABDOMINAL PAIN: 1
HEMATOCHEZIA: 0
CONSTIPATION: 0
BELCHING: 0
FLATUS: 0
VOMITING: 0
NAUSEA: 0
MYALGIAS: 1

## 2019-05-31 ASSESSMENT — CROHNS DISEASE ACTIVITY INDEX (CDAI): CDAI SCORE: 0

## 2019-05-31 NOTE — PROGRESS NOTES
"Subjective:   Ladarius Soler is a 28 y.o. male who presents for RLQ Pain (states it has been off and on, tightness with moving and couging )        Patient planes of right-sided flank pain x1 day.  He states that his symptoms began when he attempted to pick an object up off the floor and he felt a pulling sensation in his side.  States that sensation feels like a \"pulled muscle\" or a \"side stitch from running.\"  He is eating and drinking well and denies changes in bowel movements.  No dysuria or hematuria.  No nausea or vomiting.      Abdominal Pain   This is a new problem. The current episode started yesterday. The problem occurs intermittently. The problem has been unchanged. The pain is located in the right flank. The quality of the pain is aching (tightness). The abdominal pain does not radiate. Associated symptoms include myalgias. Pertinent negatives include no anorexia, belching, constipation, diarrhea, dysuria, fever, flatus, frequency, hematochezia, hematuria, melena, nausea or vomiting. The pain is aggravated by movement (trunk rotation). The pain is relieved by being still. He has tried nothing for the symptoms. There is no history of abdominal surgery, Crohn's disease, gallstones, pancreatitis or ulcerative colitis.     Review of Systems   Constitutional: Negative for fever.   Gastrointestinal: Positive for abdominal pain. Negative for anorexia, constipation, diarrhea, flatus, hematochezia, melena, nausea and vomiting.   Genitourinary: Negative for dysuria, frequency and hematuria.   Musculoskeletal: Positive for myalgias.       PMH: anxiety  MEDS:   Current Outpatient Prescriptions:   •  naproxen (NAPROSYN) 500 MG Tab, Take 1 Tab by mouth 2 times a day, with meals., Disp: 30 Tab, Rfl: 0  •  ALPRAZolam (XANAX) 1 MG Tab, Take 1 mg by mouth at bedtime as needed for Sleep., Disp: , Rfl:   •  meclizine (ANTIVERT) 25 MG Tab, Take 1 Tab by mouth 3 times a day as needed for Dizziness. (Patient not taking: " "Reported on 12/20/2018), Disp: 30 Tab, Rfl: 0  •  fluticasone (FLONASE) 50 MCG/ACT nasal spray, Spray 1 Spray in nose every day. (Patient not taking: Reported on 12/20/2018), Disp: 16 g, Rfl: 0  •  Hydrocod Polst-CPM Polst ER (TUSSIONEX) 10-8 MG/5ML Suspension Extended Release, Take 5 mL by mouth every 12 hours. (Patient not taking: Reported on 12/20/2018), Disp: 140 mL, Rfl: 0  •  ibuprofen (MOTRIN) 800 MG TABS, Take 800 mg by mouth every 8 hours as needed., Disp: , Rfl:   ALLERGIES: No Known Allergies  SURGHX: No past surgical history on file.  SOCHX:  reports that he has quit smoking. He quit after 3.00 years of use. He has never used smokeless tobacco. He reports that he drinks alcohol. He reports that he uses drugs.  FH: Family history was reviewed, no pertinent findings to report   Objective:   /72 (BP Location: Right arm, Patient Position: Sitting, BP Cuff Size: Large adult)   Pulse 70   Temp 36.6 °C (97.8 °F) (Temporal)   Resp 16   Ht 1.93 m (6' 4\")   Wt 95.3 kg (210 lb)   SpO2 96%   BMI 25.56 kg/m²   Physical Exam   Constitutional: He appears well-developed and well-nourished.  Non-toxic appearance. No distress.   HENT:   Head: Normocephalic and atraumatic.   Right Ear: External ear normal.   Left Ear: External ear normal.   Nose: Nose normal.   Neck: Neck supple.   Pulmonary/Chest: Effort normal. No respiratory distress.   Abdominal: Soft. Normal appearance and bowel sounds are normal. He exhibits no distension, no pulsatile liver, no ascites and no mass. There is no hepatosplenomegaly. There is no tenderness. There is no rigidity, no rebound, no guarding, no CVA tenderness, no tenderness at McBurney's point and negative Patterson's sign. No hernia. Hernia confirmed negative in the ventral area.       Rovsing's negative.  Superficial tenderness to marked area.  Additionally there is a palpable knot in the muscle at this location.  Symptoms exacerbated with rotation of the torso to the left and to " the right.  No palpable mass or bulge with abdominal crunch from supine position.   Neurological: He is alert.   Skin: Skin is warm and dry. Capillary refill takes less than 2 seconds.   Psychiatric: He has a normal mood and affect. His speech is normal and behavior is normal. Judgment and thought content normal. Cognition and memory are normal.   Vitals reviewed.        Assessment/Plan:   1. Strain of abdominal muscle, initial encounter  - naproxen (NAPROSYN) 500 MG Tab; Take 1 Tab by mouth 2 times a day, with meals.  Dispense: 30 Tab; Refill: 0  - POCT Urinalysis    No red flag symptoms.  UA within normal limits.  No evidence of an intra-abdominal pathology on physical examination.  Physical exam remarkable for muscle spasm and tenderness that is exacerbated by movement.  Patient advised that based on physical examination I feel that hernia and intra-abdominal pathologies are unlikely.  However, red flags discussed at length with patient and he was instructed to go to the emergency room should he develop any of these.      Patient started on Naprosyn.  Additionally I would like him to apply heat to the affected area several times a day.  I would like him to discontinue his gym routine until symptoms fully resolved.    If symptoms worsen or fail to improve he was instructed to return to clinic for reevaluation.  If he develops severe abdominal pain, nausea, vomiting, fevers, chills or other concerning symptoms he was instructed to go to the emergency room for reevaluation.    Differential diagnosis, natural history, supportive care, and indications for immediate follow-up discussed.

## 2019-10-16 ENCOUNTER — HOSPITAL ENCOUNTER (OUTPATIENT)
Dept: RADIOLOGY | Facility: MEDICAL CENTER | Age: 29
End: 2019-10-16
Attending: INTERNAL MEDICINE
Payer: COMMERCIAL

## 2019-10-16 DIAGNOSIS — R10.30 ABDOMINAL PAIN, LOWER: ICD-10-CM

## 2019-10-16 PROCEDURE — 76700 US EXAM ABDOM COMPLETE: CPT

## 2021-05-03 ENCOUNTER — OFFICE VISIT (OUTPATIENT)
Dept: URGENT CARE | Facility: PHYSICIAN GROUP | Age: 31
End: 2021-05-03
Payer: COMMERCIAL

## 2021-05-03 VITALS
OXYGEN SATURATION: 98 % | DIASTOLIC BLOOD PRESSURE: 74 MMHG | RESPIRATION RATE: 16 BRPM | TEMPERATURE: 97.2 F | BODY MASS INDEX: 28.37 KG/M2 | HEART RATE: 78 BPM | WEIGHT: 233 LBS | SYSTOLIC BLOOD PRESSURE: 126 MMHG | HEIGHT: 76 IN

## 2021-05-03 DIAGNOSIS — R10.31 RLQ ABDOMINAL PAIN: ICD-10-CM

## 2021-05-03 LAB
APPEARANCE UR: CLEAR
BILIRUB UR STRIP-MCNC: NORMAL MG/DL
COLOR UR AUTO: YELLOW
GLUCOSE UR STRIP.AUTO-MCNC: NORMAL MG/DL
KETONES UR STRIP.AUTO-MCNC: NORMAL MG/DL
LEUKOCYTE ESTERASE UR QL STRIP.AUTO: NORMAL
NITRITE UR QL STRIP.AUTO: NORMAL
PH UR STRIP.AUTO: 5.5 [PH] (ref 5–8)
PROT UR QL STRIP: NORMAL MG/DL
RBC UR QL AUTO: NORMAL
SP GR UR STRIP.AUTO: 1.03
UROBILINOGEN UR STRIP-MCNC: NORMAL MG/DL

## 2021-05-03 PROCEDURE — 99214 OFFICE O/P EST MOD 30 MIN: CPT | Performed by: FAMILY MEDICINE

## 2021-05-03 PROCEDURE — 81002 URINALYSIS NONAUTO W/O SCOPE: CPT | Performed by: FAMILY MEDICINE

## 2021-05-03 ASSESSMENT — ENCOUNTER SYMPTOMS
FEVER: 0
ABDOMINAL PAIN: 1
SORE THROAT: 0
VOMITING: 0
COUGH: 0

## 2021-05-03 NOTE — PROGRESS NOTES
"Subjective:     Ladarius Soler is a 30 y.o. male who presents for Abdominal Pain (R side abd pain x3 wks)    HPI  Pt presents for evaluation of an acute problem  Pt with right sided abd pain for the past 3 weeks   Started initially after lifting weights at the gym and he had assumed that he was just sore from working out  Pain has been intermittent since then   Pain can be when he is walking, sitting, or standing   Not affected by oral intake   He is unable to reproduce the pain with any specific movements  No associated diarrhea or constipation  Does have pain nearly every time he urinates  No changes in urination otherwise, no dysuria, hesitancy, or hematuria  Patient has had instances of right lower quadrant pain a few times in the past few years and was told by another doctor that it could be IBS    Review of Systems   Constitutional: Negative for fever.   HENT: Negative for sore throat.    Respiratory: Negative for cough.    Gastrointestinal: Positive for abdominal pain. Negative for vomiting.   Skin: Negative for rash.       PMH:  has no past medical history on file.  MEDS: No current outpatient medications on file.  ALLERGIES: No Known Allergies  SURGHX: History reviewed. No pertinent surgical history.  SOCHX:  reports that he has quit smoking. He quit after 3.00 years of use. He has never used smokeless tobacco. He reports current alcohol use. He reports current drug use.     Objective:   /74 (BP Location: Left arm, Patient Position: Sitting, BP Cuff Size: Adult)   Pulse 78   Temp 36.2 °C (97.2 °F) (Temporal)   Resp 16   Ht 1.93 m (6' 4\")   Wt 106 kg (233 lb)   SpO2 98%   BMI 28.36 kg/m²     Physical Exam  Constitutional:       General: He is not in acute distress.     Appearance: He is well-developed. He is not diaphoretic.   HENT:      Head: Normocephalic and atraumatic.   Pulmonary:      Effort: Pulmonary effort is normal.   Abdominal:      General: Abdomen is flat. Bowel sounds are normal. " There is no distension.      Palpations: Abdomen is soft.      Tenderness: There is no left CVA tenderness, guarding or rebound.      Comments: +Slight CVA tenderness and moderate tenderness in right lower quadrant at McBurney's point   Skin:     General: Skin is warm and dry.      Findings: No rash.   Neurological:      Mental Status: He is alert.         Assessment/Plan:   Assessment    1. RLQ abdominal pain  - CT-ABDOMEN-PELVIS WITH & W/O; Future  - POCT Urinalysis    Patient with right lower abdominal pain which has been present for 3 weeks this time, however has recurred a few times in the past as well.  He did have an abdominal ultrasound a few years ago which was within normal limits.  Past point-of-care urines have been normal and have not shown hematuria.  Point-of-care urine in office today within normal limits.  At this point, advised that differential could include chronic appendicitis, muscular strain, hernia, urinary stone, and other problems.  Do not appreciate a hernia on exam.  Patient has slight right CVA tenderness and pain is most reliably reproduced with right lower quadrant palpation at McBurney's point.  Because of this pain is not resolving and has happened a few times in the past, did recommend work-up this time around to ensure this is not a chronic recurrent appendicitis or urinary stones.  Will obtain CT to further evaluate and follow-up test results.

## 2021-05-11 ENCOUNTER — HOSPITAL ENCOUNTER (OUTPATIENT)
Dept: RADIOLOGY | Facility: MEDICAL CENTER | Age: 31
End: 2021-05-11
Attending: FAMILY MEDICINE
Payer: COMMERCIAL

## 2021-05-11 DIAGNOSIS — R10.31 RLQ ABDOMINAL PAIN: ICD-10-CM

## 2021-05-11 PROCEDURE — 700117 HCHG RX CONTRAST REV CODE 255: Performed by: FAMILY MEDICINE

## 2021-05-11 PROCEDURE — 74177 CT ABD & PELVIS W/CONTRAST: CPT

## 2021-05-11 RX ADMIN — IOHEXOL 100 ML: 350 INJECTION, SOLUTION INTRAVENOUS at 08:13

## 2021-05-13 ENCOUNTER — TELEPHONE (OUTPATIENT)
Dept: URGENT CARE | Facility: PHYSICIAN GROUP | Age: 31
End: 2021-05-13

## 2021-05-13 DIAGNOSIS — R10.31 RLQ ABDOMINAL PAIN: ICD-10-CM

## 2021-05-14 NOTE — TELEPHONE ENCOUNTER
Discussed test results.  Patient plans to try experimenting with some dietary changes and will refer to gastroenterology for further follow-up.

## 2021-11-04 ENCOUNTER — OFFICE VISIT (OUTPATIENT)
Dept: URGENT CARE | Facility: PHYSICIAN GROUP | Age: 31
End: 2021-11-04
Payer: COMMERCIAL

## 2021-11-04 VITALS
TEMPERATURE: 98.8 F | SYSTOLIC BLOOD PRESSURE: 132 MMHG | HEART RATE: 80 BPM | BODY MASS INDEX: 27.03 KG/M2 | RESPIRATION RATE: 20 BRPM | WEIGHT: 222 LBS | OXYGEN SATURATION: 97 % | DIASTOLIC BLOOD PRESSURE: 72 MMHG | HEIGHT: 76 IN

## 2021-11-04 DIAGNOSIS — M25.531 RIGHT WRIST PAIN: ICD-10-CM

## 2021-11-04 PROBLEM — F41.0 PANIC DISORDER: Status: ACTIVE | Noted: 2021-02-16

## 2021-11-04 PROCEDURE — 99213 OFFICE O/P EST LOW 20 MIN: CPT | Performed by: FAMILY MEDICINE

## 2021-11-04 RX ORDER — PROPRANOLOL HYDROCHLORIDE 10 MG/1
TABLET ORAL
COMMUNITY
Start: 2021-09-30

## 2021-11-05 NOTE — PATIENT INSTRUCTIONS
Please take 400 mg of Ibuprofen three times per day for the next 4 days. You may add Tylenol in between if pain persists

## 2021-11-05 NOTE — PROGRESS NOTES
"  Subjective:      31 y.o., right hand dominant, male presents to urgent care for right wrist pain that started 3 weeks ago.  There was no inciting event or trauma.  The pain is intermittent, it does come with movement, and is alleviated with rest, is described as sharp.  He has been using Biofreeze cream, some ibuprofen, and wearing a wrist brace -all of which have improved his symptoms, but not relieved them.  There is no associated numbness, tingling, or weakness to his fingers.  He has a history of similar pain several years ago, and got a steroid injection into his wrist, he is hoping to get a referral to get this done again.    He denies any other questions or concerns at this time.    Current problem list, medication, and past medical/surgical history were reviewed in Epic.    ROS  See HPI     Objective:      /72 (BP Location: Left arm, Patient Position: Sitting, BP Cuff Size: Adult)   Pulse 80   Temp 37.1 °C (98.8 °F) (Temporal)   Resp 20   Ht 1.93 m (6' 4\")   Wt 101 kg (222 lb)   SpO2 97%   BMI 27.02 kg/m²     Physical Exam  Constitutional:       General: He is not in acute distress.     Appearance: He is not diaphoretic.   Cardiovascular:      Rate and Rhythm: Normal rate and regular rhythm.      Heart sounds: Normal heart sounds.   Pulmonary:      Effort: Pulmonary effort is normal. No respiratory distress.      Breath sounds: Normal breath sounds.   Musculoskeletal:      Comments: No discolorations or deformities noted to inspection of hands bilaterally.  Equal strength and sensation.  He is able to keep the okay sign intact and fingers abducted against resistance. Phalen test positive, Tinel's negative.   Neurological:      Mental Status: He is alert.   Psychiatric:         Mood and Affect: Affect normal.         Judgment: Judgment normal.       Assessment/Plan:     1. Right wrist pain  Referral to hand surgery has been placed.  In the meantime he was encouraged to continue wearing his " brace, take ibuprofen schedule III times daily for the next 4 days, and he may use Tylenol, ice, heat as needed for symptomatic relief.  - Referral to Hand Surgery      Instructed to return to Urgent Care or nearest Emergency Department if symptoms fail to improve, for any change in condition, further concerns, or new concerning symptoms. Patient states understanding of the plan of care and discharge instructions.    Destiny Carlson M.D.

## 2021-11-29 ENCOUNTER — NON-PROVIDER VISIT (OUTPATIENT)
Dept: OCCUPATIONAL MEDICINE | Facility: CLINIC | Age: 31
End: 2021-11-29

## 2021-11-29 DIAGNOSIS — Z02.1 PRE-EMPLOYMENT HEALTH SCREENING EXAMINATION: ICD-10-CM

## 2021-11-29 DIAGNOSIS — Z02.1 PRE-EMPLOYMENT DRUG SCREENING: ICD-10-CM

## 2021-11-29 LAB
AMP AMPHETAMINE: NORMAL
COC COCAINE: NORMAL
INT CON NEG: NORMAL
INT CON POS: NORMAL
MET METHAMPHETAMINES: NORMAL
OPI OPIATES: NORMAL
PCP PHENCYCLIDINE: NORMAL
POC DRUG COMMENT 753798-OCCUPATIONAL HEALTH: NEGATIVE
THC: NORMAL

## 2021-11-29 PROCEDURE — 80305 DRUG TEST PRSMV DIR OPT OBS: CPT | Performed by: PREVENTIVE MEDICINE

## 2021-12-30 ENCOUNTER — APPOINTMENT (OUTPATIENT)
Dept: URGENT CARE | Facility: PHYSICIAN GROUP | Age: 31
End: 2021-12-30
Payer: COMMERCIAL

## 2022-04-28 ENCOUNTER — HOSPITAL ENCOUNTER (OUTPATIENT)
Facility: MEDICAL CENTER | Age: 32
End: 2022-04-28
Attending: INTERNAL MEDICINE
Payer: COMMERCIAL

## 2022-04-28 ENCOUNTER — OFFICE VISIT (OUTPATIENT)
Dept: URGENT CARE | Facility: PHYSICIAN GROUP | Age: 32
End: 2022-04-28
Payer: COMMERCIAL

## 2022-04-28 VITALS
TEMPERATURE: 97.3 F | WEIGHT: 220 LBS | RESPIRATION RATE: 16 BRPM | OXYGEN SATURATION: 96 % | HEART RATE: 83 BPM | BODY MASS INDEX: 27.35 KG/M2 | SYSTOLIC BLOOD PRESSURE: 142 MMHG | DIASTOLIC BLOOD PRESSURE: 80 MMHG | HEIGHT: 75 IN

## 2022-04-28 DIAGNOSIS — Z20.822 SUSPECTED COVID-19 VIRUS INFECTION: ICD-10-CM

## 2022-04-28 DIAGNOSIS — R05.9 COUGH: ICD-10-CM

## 2022-04-28 DIAGNOSIS — R09.81 NASAL CONGESTION: ICD-10-CM

## 2022-04-28 DIAGNOSIS — J02.9 ACUTE PHARYNGITIS, UNSPECIFIED ETIOLOGY: ICD-10-CM

## 2022-04-28 LAB
FLUAV+FLUBV AG SPEC QL IA: NEGATIVE
INT CON NEG: NORMAL
INT CON NEG: NORMAL
INT CON POS: NORMAL
INT CON POS: NORMAL
S PYO AG THROAT QL: NEGATIVE

## 2022-04-28 PROCEDURE — 87880 STREP A ASSAY W/OPTIC: CPT | Performed by: INTERNAL MEDICINE

## 2022-04-28 PROCEDURE — 99213 OFFICE O/P EST LOW 20 MIN: CPT | Mod: 25 | Performed by: INTERNAL MEDICINE

## 2022-04-28 PROCEDURE — U0003 INFECTIOUS AGENT DETECTION BY NUCLEIC ACID (DNA OR RNA); SEVERE ACUTE RESPIRATORY SYNDROME CORONAVIRUS 2 (SARS-COV-2) (CORONAVIRUS DISEASE [COVID-19]), AMPLIFIED PROBE TECHNIQUE, MAKING USE OF HIGH THROUGHPUT TECHNOLOGIES AS DESCRIBED BY CMS-2020-01-R: HCPCS

## 2022-04-28 PROCEDURE — U0005 INFEC AGEN DETEC AMPLI PROBE: HCPCS

## 2022-04-28 PROCEDURE — 87804 INFLUENZA ASSAY W/OPTIC: CPT | Performed by: INTERNAL MEDICINE

## 2022-04-28 RX ORDER — ALPRAZOLAM 0.5 MG/1
TABLET ORAL
COMMUNITY
Start: 2022-03-28

## 2022-04-28 RX ORDER — BENZONATATE 100 MG/1
100 CAPSULE ORAL 3 TIMES DAILY PRN
Qty: 60 CAPSULE | Refills: 0 | Status: SHIPPED | OUTPATIENT
Start: 2022-04-28

## 2022-04-28 RX ORDER — FLUTICASONE PROPIONATE 50 MCG
1 SPRAY, SUSPENSION (ML) NASAL DAILY
Qty: 16 G | Refills: 0 | Status: SHIPPED | OUTPATIENT
Start: 2022-04-28

## 2022-04-28 ASSESSMENT — ENCOUNTER SYMPTOMS
SWOLLEN GLANDS: 0
CHILLS: 0
SORE THROAT: 1
COUGH: 1
NECK PAIN: 0
FEVER: 0
SHORTNESS OF BREATH: 0

## 2022-04-28 NOTE — LETTER
April 28, 2022    To Whom It May Concern:         This is confirmation that Ladarius Soler was seen at the urgent care on 4/28/2022         If you have any questions please do not hesitate to call me at the phone number listed below.    Sincerely,          Roque Anthony M.D.  [phone]

## 2022-04-28 NOTE — PROGRESS NOTES
Chief Complaint:      HPI:      Ladarius Soler is a 31 y.o. male with   Pharyngitis   This is a new problem. The current episode started in the past 7 days. The problem has been unchanged. Neither side of throat is experiencing more pain than the other. There has been no fever. The pain is at a severity of 3/10. The pain is mild. Associated symptoms include congestion and coughing. Pertinent negatives include no neck pain, shortness of breath or swollen glands. He has had no exposure to strep or mono.   He reports that the symptoms started while he was at [employer] festival  Reports that he is vaccinated for COVID-19 and received a booster and he did complete a rapid test at home which came back negative        ROS:   Review of Systems   Constitutional: Negative for chills and fever.   HENT: Positive for congestion and sore throat.    Respiratory: Positive for cough. Negative for shortness of breath.    Musculoskeletal: Negative for neck pain.        Past Medical History:  He   Patient Active Problem List    Diagnosis Date Noted   • Panic disorder 02/16/2021     Past Surgical History:  He History reviewed. No pertinent surgical history.   Allergies:  He Patient has no known allergies.   Current Medications:  He   Current Outpatient Medications:   •  ALPRAZolam (XANAX) 0.5 MG Tab, , Disp: , Rfl:   •  propranolol (INDERAL) 10 MG Tab, , Disp: , Rfl:   Social History:  He   Social History     Socioeconomic History   • Marital status: Single     Spouse name: Not on file   • Number of children: Not on file   • Years of education: Not on file   • Highest education level: Not on file   Occupational History   • Not on file   Tobacco Use   • Smoking status: Former Smoker     Years: 3.00   • Smokeless tobacco: Never Used   Vaping Use   • Vaping Use: Never used   Substance and Sexual Activity   • Alcohol use: Yes     Comment: occasional   • Drug use: Not Currently   • Sexual activity: Not on file   Other Topics Concern   •  "Not on file   Social History Narrative   • Not on file     Social Determinants of Health     Financial Resource Strain: Not on file   Food Insecurity: Not on file   Transportation Needs: Not on file   Physical Activity: Not on file   Stress: Not on file   Social Connections: Not on file   Intimate Partner Violence: Not on file   Housing Stability: Not on file      Family History:   His History reviewed. No pertinent family history.     PHYSICAL EXAM:    Vital signs: /80 (BP Location: Left arm, Patient Position: Sitting, BP Cuff Size: Adult)   Pulse 83   Temp 36.3 °C (97.3 °F) (Temporal)   Resp 16   Ht 1.905 m (6' 3\")   Wt 99.8 kg (220 lb)   SpO2 96%   BMI 27.50 kg/m²   Physical Exam  Constitutional:       Appearance: Normal appearance. He is normal weight.   HENT:      Head: Normocephalic and atraumatic.      Right Ear: Tympanic membrane normal.      Left Ear: Tympanic membrane normal.      Nose: Congestion present.      Mouth/Throat:      Mouth: Mucous membranes are moist.      Pharynx: No oropharyngeal exudate or posterior oropharyngeal erythema.   Eyes:      Extraocular Movements: Extraocular movements intact.      Conjunctiva/sclera: Conjunctivae normal.      Pupils: Pupils are equal, round, and reactive to light.   Cardiovascular:      Rate and Rhythm: Normal rate and regular rhythm.      Pulses: Normal pulses.      Heart sounds: Normal heart sounds.   Pulmonary:      Effort: Pulmonary effort is normal.      Breath sounds: Normal breath sounds.   Neurological:      Mental Status: He is alert.         Labs:  No results found for: HBA1C   No results found for: CHOLSTRLTOT, TRIGLYCERIDE, HDL, LDL, CHOLHDLRAT, NONHDL  No results found for: MICROALBCALC, MALBCRT, MALBEXCR, MQQIFQ14, MICROALBUR, MICRALB, UMICROALBUM, MICROALBTIM   Lab Results   Component Value Date/Time    CREATININE 0.93 02/13/2018 09:51 PM           ASSESSMENT/PLAN:   1. Acute pharyngitis, unspecified etiology  Rapid strep and flu was " negative  Discussed with patient that antibiotic treatment is not indicated at this time  Recommend conservative measures such as Tylenol, NSAIDs, throat lozenges and warm saline gargle  - POCT Influenza A/B  - POCT Rapid Strep A    2. Cough  Start Tessalon Perles    3. Nasal congestion  Start Flonase    4. Suspected COVID-19 virus infection  COVID PCR was obtained we will update patient on test results  Advised that he isolate and continue to wear a mask pending test results  - COVID/SARS CoV-2 PCR; Future      Return if symptoms worsen or fail to improve.      This patient during there office visit was started on new medication.  Side effects of new medications were discussed with the patient today in the office. The patient was supplied paperwork on this new medication.    Red flags discussed and when to RTC or seek care in the ER  Supportive care, differential diagnoses, and indications for immediate follow-up discussed with patient.    Pathogenesis of diagnosis discussed including typical length and natural progression.       Instructed to return to  or nearest emergency department if symptoms fail to improve, for any change in condition, further concerns, or new concerning symptoms.  Patient states understanding of the plan of care and discharge instructions.      Roque Anthony MD, FACE, N  04/28/22

## 2022-04-29 DIAGNOSIS — Z20.822 SUSPECTED COVID-19 VIRUS INFECTION: ICD-10-CM

## 2022-04-29 LAB
COVID ORDER STATUS COVID19: NORMAL
SARS-COV-2 RNA RESP QL NAA+PROBE: NOTDETECTED
SPECIMEN SOURCE: NORMAL

## 2022-08-22 ENCOUNTER — NON-PROVIDER VISIT (OUTPATIENT)
Dept: URGENT CARE | Facility: PHYSICIAN GROUP | Age: 32
End: 2022-08-22

## 2022-08-22 DIAGNOSIS — Z11.1 PPD SCREENING TEST: ICD-10-CM

## 2022-08-22 DIAGNOSIS — Z02.1 PRE-EMPLOYMENT DRUG SCREENING: ICD-10-CM

## 2022-08-22 LAB
AMP AMPHETAMINE: NORMAL
BAR BARBITURATES: NORMAL
BREATH ALCOHOL COMMENT: NORMAL
BZO BENZODIAZEPINES: NORMAL
COC COCAINE: NORMAL
INT CON NEG: NORMAL
INT CON POS: NORMAL
MDMA ECSTASY: NORMAL
MET METHAMPHETAMINES: NORMAL
MTD METHADONE: NORMAL
OPI OPIATES: NORMAL
OXY OXYCODONE: NORMAL
PCP PHENCYCLIDINE: NORMAL
POC BREATHALIZER: 0 PERCENT (ref 0–0.01)
POC URINE DRUG SCREEN OCDRS: NORMAL
THC: NORMAL

## 2022-08-22 PROCEDURE — 86580 TB INTRADERMAL TEST: CPT | Performed by: PHYSICIAN ASSISTANT

## 2022-08-22 PROCEDURE — 82075 ASSAY OF BREATH ETHANOL: CPT | Performed by: EMERGENCY MEDICINE

## 2022-08-22 PROCEDURE — 80305 DRUG TEST PRSMV DIR OPT OBS: CPT | Performed by: EMERGENCY MEDICINE

## 2022-09-01 ENCOUNTER — NON-PROVIDER VISIT (OUTPATIENT)
Dept: URGENT CARE | Facility: PHYSICIAN GROUP | Age: 32
End: 2022-09-01

## 2022-09-01 DIAGNOSIS — Z11.1 PPD SCREENING TEST: ICD-10-CM

## 2022-09-01 PROCEDURE — 86580 TB INTRADERMAL TEST: CPT | Performed by: NURSE PRACTITIONER

## 2022-09-01 NOTE — PROGRESS NOTES
Ladarius Soler is a 32 y.o. male here for a non-provider visit for PPD placement -- Step 1 of 2    Reason for PPD:  work requirement    1. TB evaluation questionnaire completed by patient? Yes      -  If any answers marked yes did you contact a provider prior to placing? Yes  2.  Patient notified to return to clinic for reading on: 09/03/2022  3.  PPD Placement documentation completed on TB evaluation questionnaire? Yes  4.  Location of TB evaluation questionnaire filed: [facility]

## 2022-09-04 ENCOUNTER — NON-PROVIDER VISIT (OUTPATIENT)
Dept: URGENT CARE | Facility: PHYSICIAN GROUP | Age: 32
End: 2022-09-04

## 2022-09-04 LAB — TB WHEAL 3D P 5 TU DIAM: 0 MM

## 2022-09-04 NOTE — PROGRESS NOTES
Ladarius Soler is a 32 y.o. male here for a non-provider visit for PPD reading -- Step 1 of 2.      1.  Resulted in Epic under enter/edit results? Yes   2.  TB evaluation questionnaire scanned into chart and original given to patient?Yes      3. Was induration greater than 0 mm? No.    If Step 1 of 2, when is patient returning for second step (delete if N/A): 9/11/22    Routed to PCP? No

## 2022-09-14 ENCOUNTER — NON-PROVIDER VISIT (OUTPATIENT)
Dept: URGENT CARE | Facility: PHYSICIAN GROUP | Age: 32
End: 2022-09-14

## 2022-09-14 DIAGNOSIS — Z11.1 PPD SCREENING TEST: ICD-10-CM

## 2022-09-14 PROCEDURE — 86580 TB INTRADERMAL TEST: CPT

## 2022-09-14 NOTE — PROGRESS NOTES
Ladarius Soler is a 32 y.o. male here for a non-provider visit for PPD placement -- Step 2 of 2    Reason for PPD:  work requirement    1. TB evaluation questionnaire completed by patient? Yes      -  If any answers marked yes did you contact a provider prior to placing? Yes  2.  Patient notified to return to clinic for reading on: 9/16/22-9/17/22  3.  PPD Placement documentation completed on TB evaluation questionnaire? Yes  4.  Location of TB evaluation questionnaire filed:

## 2022-09-16 ENCOUNTER — NON-PROVIDER VISIT (OUTPATIENT)
Dept: URGENT CARE | Facility: PHYSICIAN GROUP | Age: 32
End: 2022-09-16
Payer: COMMERCIAL

## 2022-09-16 LAB — TB WHEAL 3D P 5 TU DIAM: NORMAL MM

## 2022-09-17 NOTE — PROGRESS NOTES
Ladarius Soler is a 32 y.o. male here for a non-provider visit for PPD reading -- Step 2 of 2.      1.  Resulted in Epic under enter/edit results? Yes   2.  TB evaluation questionnaire scanned into chart and original given to patient?Yes      3. Was induration greater than 0 mm? No.      Routed to PCP? Yes

## 2024-03-20 ENCOUNTER — NON-PROVIDER VISIT (OUTPATIENT)
Dept: OCCUPATIONAL MEDICINE | Facility: CLINIC | Age: 34
End: 2024-03-20

## 2024-03-20 ENCOUNTER — HOSPITAL ENCOUNTER (OUTPATIENT)
Facility: MEDICAL CENTER | Age: 34
End: 2024-03-20
Attending: NURSE PRACTITIONER
Payer: COMMERCIAL

## 2024-03-20 DIAGNOSIS — Z02.89 ENCOUNTER FOR OCCUPATIONAL HEALTH ASSESSMENT: ICD-10-CM

## 2024-03-20 DIAGNOSIS — Z02.1 PRE-EMPLOYMENT DRUG SCREENING: ICD-10-CM

## 2024-03-20 LAB
AMP AMPHETAMINE: NORMAL
BAR BARBITURATES: NORMAL
BREATH ALCOHOL COMMENT: NORMAL
BZO BENZODIAZEPINES: NORMAL
COC COCAINE: NORMAL
INT CON NEG: NORMAL
INT CON POS: NORMAL
MDMA ECSTASY: NORMAL
MET METHAMPHETAMINES: NORMAL
MTD METHADONE: NORMAL
OPI OPIATES: NORMAL
OXY OXYCODONE: NORMAL
PCP PHENCYCLIDINE: NORMAL
POC BREATHALIZER: 0 PERCENT (ref 0–0.01)
POC URINE DRUG SCREEN OCDRS: NEGATIVE
THC: NORMAL

## 2024-03-20 PROCEDURE — 82075 ASSAY OF BREATH ETHANOL: CPT | Performed by: NURSE PRACTITIONER

## 2024-03-20 PROCEDURE — 80305 DRUG TEST PRSMV DIR OPT OBS: CPT | Performed by: NURSE PRACTITIONER

## 2024-03-20 PROCEDURE — 86480 TB TEST CELL IMMUN MEASURE: CPT | Performed by: NURSE PRACTITIONER

## 2024-03-24 LAB
GAMMA INTERFERON BACKGROUND BLD IA-ACNC: 0.04 IU/ML
M TB IFN-G BLD-IMP: NEGATIVE
M TB IFN-G CD4+ BCKGRND COR BLD-ACNC: 0.04 IU/ML
MITOGEN IGNF BCKGRD COR BLD-ACNC: >10 IU/ML
QFT TB2 - NIL TBQ2: 0.04 IU/ML

## 2025-06-04 ENCOUNTER — NON-PROVIDER VISIT (OUTPATIENT)
Dept: OCCUPATIONAL MEDICINE | Facility: CLINIC | Age: 35
End: 2025-06-04

## 2025-06-04 ENCOUNTER — HOSPITAL ENCOUNTER (OUTPATIENT)
Facility: MEDICAL CENTER | Age: 35
End: 2025-06-04
Attending: NURSE PRACTITIONER
Payer: COMMERCIAL

## 2025-06-04 DIAGNOSIS — Z02.89 ENCOUNTER FOR OCCUPATIONAL HEALTH ASSESSMENT: ICD-10-CM

## 2025-06-04 DIAGNOSIS — Z02.89 ENCOUNTER FOR OCCUPATIONAL HEALTH ASSESSMENT: Primary | ICD-10-CM

## 2025-06-04 PROCEDURE — 86480 TB TEST CELL IMMUN MEASURE: CPT | Performed by: NURSE PRACTITIONER

## 2025-06-05 LAB
GAMMA INTERFERON BACKGROUND BLD IA-ACNC: 0.03 IU/ML
M TB IFN-G BLD-IMP: NEGATIVE
M TB IFN-G CD4+ BCKGRND COR BLD-ACNC: 0.02 IU/ML
MITOGEN IGNF BCKGRD COR BLD-ACNC: >10 IU/ML
QFT TB2 - NIL TBQ2: 0.01 IU/ML

## 2025-08-27 ENCOUNTER — APPOINTMENT (OUTPATIENT)
Dept: RADIOLOGY | Facility: MEDICAL CENTER | Age: 35
End: 2025-08-27
Attending: NURSE PRACTITIONER
Payer: COMMERCIAL